# Patient Record
Sex: MALE | Race: WHITE | NOT HISPANIC OR LATINO | Employment: UNEMPLOYED | URBAN - NONMETROPOLITAN AREA
[De-identification: names, ages, dates, MRNs, and addresses within clinical notes are randomized per-mention and may not be internally consistent; named-entity substitution may affect disease eponyms.]

---

## 2021-11-23 ENCOUNTER — OFFICE VISIT (OUTPATIENT)
Dept: FAMILY MEDICINE | Facility: OTHER | Age: 56
End: 2021-11-23
Attending: NURSE PRACTITIONER
Payer: COMMERCIAL

## 2021-11-23 ENCOUNTER — OFFICE VISIT (OUTPATIENT)
Dept: PSYCHOLOGY | Facility: OTHER | Age: 56
End: 2021-11-23
Attending: COUNSELOR
Payer: COMMERCIAL

## 2021-11-23 ENCOUNTER — IMMUNIZATION (OUTPATIENT)
Dept: FAMILY MEDICINE | Facility: OTHER | Age: 56
End: 2021-11-23
Attending: FAMILY MEDICINE
Payer: COMMERCIAL

## 2021-11-23 VITALS
HEART RATE: 90 BPM | RESPIRATION RATE: 18 BRPM | BODY MASS INDEX: 25.61 KG/M2 | WEIGHT: 169 LBS | OXYGEN SATURATION: 98 % | DIASTOLIC BLOOD PRESSURE: 62 MMHG | SYSTOLIC BLOOD PRESSURE: 116 MMHG | HEIGHT: 68 IN | TEMPERATURE: 98.2 F

## 2021-11-23 DIAGNOSIS — F32.A DEPRESSION, UNSPECIFIED DEPRESSION TYPE: ICD-10-CM

## 2021-11-23 DIAGNOSIS — F41.9 ANXIETY: Primary | ICD-10-CM

## 2021-11-23 DIAGNOSIS — F41.9 ANXIETY: ICD-10-CM

## 2021-11-23 DIAGNOSIS — F32.A DEPRESSION: Primary | ICD-10-CM

## 2021-11-23 PROBLEM — E78.5 DYSLIPIDEMIA: Status: ACTIVE | Noted: 2021-01-26

## 2021-11-23 LAB
ALBUMIN SERPL-MCNC: 3 G/DL (ref 3.4–5)
ALP SERPL-CCNC: 55 U/L (ref 40–150)
ALT SERPL W P-5'-P-CCNC: 26 U/L (ref 0–70)
ANION GAP SERPL CALCULATED.3IONS-SCNC: 6 MMOL/L (ref 3–14)
AST SERPL W P-5'-P-CCNC: 19 U/L (ref 0–45)
BASOPHILS # BLD MANUAL: 0 10E3/UL (ref 0–0.2)
BASOPHILS NFR BLD MANUAL: 0 %
BILIRUB SERPL-MCNC: 0.4 MG/DL (ref 0.2–1.3)
BUN SERPL-MCNC: 12 MG/DL (ref 7–30)
CALCIUM SERPL-MCNC: 9.3 MG/DL (ref 8.5–10.1)
CHLORIDE BLD-SCNC: 101 MMOL/L (ref 94–109)
CO2 SERPL-SCNC: 28 MMOL/L (ref 20–32)
CREAT SERPL-MCNC: 0.88 MG/DL (ref 0.66–1.25)
EOSINOPHIL # BLD MANUAL: 0 10E3/UL (ref 0–0.7)
EOSINOPHIL NFR BLD MANUAL: 0 %
ERYTHROCYTE [DISTWIDTH] IN BLOOD BY AUTOMATED COUNT: 14.9 % (ref 10–15)
GFR SERPL CREATININE-BSD FRML MDRD: >90 ML/MIN/1.73M2
GLUCOSE BLD-MCNC: 91 MG/DL (ref 70–99)
HCT VFR BLD AUTO: 36.2 % (ref 40–53)
HGB BLD-MCNC: 11.9 G/DL (ref 13.3–17.7)
LYMPHOCYTES # BLD MANUAL: 5.5 10E3/UL (ref 0.8–5.3)
LYMPHOCYTES NFR BLD MANUAL: 58 %
MCH RBC QN AUTO: 30.2 PG (ref 26.5–33)
MCHC RBC AUTO-ENTMCNC: 32.9 G/DL (ref 31.5–36.5)
MCV RBC AUTO: 92 FL (ref 78–100)
MONOCYTES # BLD MANUAL: 0.6 10E3/UL (ref 0–1.3)
MONOCYTES NFR BLD MANUAL: 6 %
NEUTROPHILS # BLD MANUAL: 3.4 10E3/UL (ref 1.6–8.3)
NEUTROPHILS NFR BLD MANUAL: 36 %
PLAT MORPH BLD: ABNORMAL
PLATELET # BLD AUTO: 302 10E3/UL (ref 150–450)
POTASSIUM BLD-SCNC: 3.4 MMOL/L (ref 3.4–5.3)
PROT SERPL-MCNC: 10 G/DL (ref 6.8–8.8)
RBC # BLD AUTO: 3.94 10E6/UL (ref 4.4–5.9)
RBC MORPH BLD: ABNORMAL
SODIUM SERPL-SCNC: 135 MMOL/L (ref 133–144)
TSH SERPL DL<=0.005 MIU/L-ACNC: 2.24 MU/L (ref 0.4–4)
VIT B12 SERPL-MCNC: 726 PG/ML (ref 193–986)
WBC # BLD AUTO: 9.4 10E3/UL (ref 4–11)

## 2021-11-23 PROCEDURE — 85027 COMPLETE CBC AUTOMATED: CPT | Performed by: NURSE PRACTITIONER

## 2021-11-23 PROCEDURE — 99204 OFFICE O/P NEW MOD 45 MIN: CPT | Performed by: NURSE PRACTITIONER

## 2021-11-23 PROCEDURE — 93000 ELECTROCARDIOGRAM COMPLETE: CPT | Performed by: INTERNAL MEDICINE

## 2021-11-23 PROCEDURE — 82607 VITAMIN B-12: CPT | Performed by: NURSE PRACTITIONER

## 2021-11-23 PROCEDURE — 36415 COLL VENOUS BLD VENIPUNCTURE: CPT | Performed by: NURSE PRACTITIONER

## 2021-11-23 PROCEDURE — 91300 PR COVID VAC PFIZER DIL RECON 30 MCG/0.3 ML IM: CPT

## 2021-11-23 PROCEDURE — 0004A PR COVID VAC PFIZER DIL RECON 30 MCG/0.3 ML IM: CPT

## 2021-11-23 PROCEDURE — 84443 ASSAY THYROID STIM HORMONE: CPT | Performed by: NURSE PRACTITIONER

## 2021-11-23 PROCEDURE — 90791 PSYCH DIAGNOSTIC EVALUATION: CPT | Performed by: COUNSELOR

## 2021-11-23 PROCEDURE — 80053 COMPREHEN METABOLIC PANEL: CPT | Performed by: NURSE PRACTITIONER

## 2021-11-23 RX ORDER — ATORVASTATIN CALCIUM 20 MG/1
TABLET, FILM COATED ORAL
COMMUNITY
Start: 2021-11-15 | End: 2021-12-15

## 2021-11-23 RX ORDER — ALBUTEROL SULFATE 90 UG/1
1-2 AEROSOL, METERED RESPIRATORY (INHALATION)
COMMUNITY
Start: 2021-11-15 | End: 2021-12-15

## 2021-11-23 RX ORDER — LOSARTAN POTASSIUM 50 MG/1
50 TABLET ORAL
COMMUNITY
Start: 2021-11-15 | End: 2021-12-15

## 2021-11-23 ASSESSMENT — ENCOUNTER SYMPTOMS
WEAKNESS: 0
VOMITING: 0
BRUISES/BLEEDS EASILY: 0
HEARTBURN: 0
COLOR CHANGE: 1
ADENOPATHY: 0
ARTHRALGIAS: 1
MYALGIAS: 1
NAUSEA: 0
DYSPHORIC MOOD: 1
SHORTNESS OF BREATH: 0
FATIGUE: 1
PALPITATIONS: 0
TREMORS: 0
FEVER: 0
SORE THROAT: 0
CONSTIPATION: 1
HEADACHES: 1
NERVOUS/ANXIOUS: 1
ACTIVITY CHANGE: 1
NUMBNESS: 1
NECK PAIN: 1
COUGH: 0
TROUBLE SWALLOWING: 0
APPETITE CHANGE: 1
BACK PAIN: 1
DIFFICULTY URINATING: 0
SEIZURES: 0
SLEEP DISTURBANCE: 1

## 2021-11-23 ASSESSMENT — ANXIETY QUESTIONNAIRES
5. BEING SO RESTLESS THAT IT IS HARD TO SIT STILL: NEARLY EVERY DAY
2. NOT BEING ABLE TO STOP OR CONTROL WORRYING: NEARLY EVERY DAY
GAD7 TOTAL SCORE: 21
1. FEELING NERVOUS, ANXIOUS, OR ON EDGE: NEARLY EVERY DAY
IF YOU CHECKED OFF ANY PROBLEMS ON THIS QUESTIONNAIRE, HOW DIFFICULT HAVE THESE PROBLEMS MADE IT FOR YOU TO DO YOUR WORK, TAKE CARE OF THINGS AT HOME, OR GET ALONG WITH OTHER PEOPLE: EXTREMELY DIFFICULT
7. FEELING AFRAID AS IF SOMETHING AWFUL MIGHT HAPPEN: NEARLY EVERY DAY
4. TROUBLE RELAXING: NEARLY EVERY DAY
6. BECOMING EASILY ANNOYED OR IRRITABLE: NEARLY EVERY DAY
3. WORRYING TOO MUCH ABOUT DIFFERENT THINGS: NEARLY EVERY DAY

## 2021-11-23 ASSESSMENT — PAIN SCALES - GENERAL: PAINLEVEL: MODERATE PAIN (4)

## 2021-11-23 ASSESSMENT — MIFFLIN-ST. JEOR: SCORE: 1565.84

## 2021-11-23 NOTE — PROGRESS NOTES
"    [facility]  Provider Name:   Lyle Jeffrey, Credentials: M.S.,[facility]    PATIENT'S NAME: Yamil Vanegas  PREFERRED NAME:  Yamil  PRONOUNS: viviana Austin  MRN: [id number]  : 1965  ADDRESS: [address] [place] [address]  ACCT. NUMBER:  [id number]  DATE OF SERVICE: 21  START TIME: 0800  END TIME: 930  PREFERRED PHONE: [phone]  May we leave a program related message: Yes  SERVICE MODALITY:  In-person    [facility] DIAGNOSTIC ASSESSMENT    Identifying Information:  Patient is a 56 year old,  .  The pronoun use throughout this assessment reflects the patient's chosen pronoun.  Patient was referred for an assessment by family .  Patient attended the session with sister during a great part of the interview..    Chief Complaint:   The reason for seeking services at this time is: \"  Suicidal thoughts and depression \"   The problem(s) began in Patient has attempted to resolve these concerns in the past through hospitalization,medication,talking with family and friends..    Social/Family History:  Patient reported they grew up in [place], [place].  They were raised by biological mother.  Parents  40 plus years ago when the patient was 9 years old. The patient mother did remarry 23 years ago The patient's father did remarry 33 years ago.   Patient reported that their childhood was 'painful'.  Patient described their current relationships with family of origin as  strained with brothers,son and wife.      The patient describes their cultural background as .  Cultural influences and impact on patient's life structure, values, norms, and healthcare: White working class.  Contextual influences on patient's health include:separation from wife,recent death of father, and a long standing dispute with a brother.    These factors will be addressed in the Preliminary Treatment plan.  Patient identified their preferred language to be English. Patient reported " "they do not  need the assistance of an  or other support involved in therapy.     Patient reported had no significant delays in developmental tasks.   Patient's highest education level was high school graduate. Patient identified the following learning problems: concentration.  Modifications will not be used to assist communication in therapy.Patient reports they is  able to understand written materials.    Patient reported the following relationship history   Patient's current relationship status is  for eight years.(been for eight years). He is presently  from his wife  Patient identified their sexual orientation as heterosexual.  Patient reported having five child(juan). Patient identified sister and wife as part of their support system.  Patient identified the quality of these relationships as generally      Patient's current living/housing situation involves staying with sister.  They live with  Sister and they report that housing is stable.     Patient is currently unemployed.  Patient reports their finances are obtained through unemployment.  Patient does identify finances as a current stressor.    ve\"} been involved with the legal system.  Patient denies being on probation / parole / under the jurisdiction of the court.      Patient reported that they {[id number]::\"ha  Patient's Strengths and Limitations:  Patient identified the following strengths or resources that will help them succeed in treatment: family support. Things that may interfere with the patient's success in treatment include: few friends and financial hardship.     Personal and Family Medical History:   Patient does report a family history of mental health concerns.  Patient reports family history includes Cancer in his father and mother..     Patient does not report Mental Health Diagnosis or Treatment.      Patient has had a physical exam to rule out medical causes for current symptoms.  Date of last physical exam " was greater than a year ago and client was encouraged to schedule an exam with PCP. The patient does not have a Primary Care Provider and was encouraged to establish care with a PCP..  Patient reports the following current medical concerns: chronic pain from physical labor..  Patient denies any issues with pain..  The pt has chronic pain   There are not significant appetite / nutritional concerns / weight changes.   Patient does report a history of head injury / trauma / cognitive impairment.  He has a hx  Of two concussions.    Patient reports current meds as:   No outpatient medications have been marked as taking for the 11/23/21 encounter (Office Visit) with Lyle [facility].       Medication Adherence:  Patient reports taking prescribed medications as prescribed.    Patient Allergies:    Allergies   Allergen Reactions     Other Environmental Allergy      Other reaction(s): *Unknown - Childhood Rxn  Seasonal allergies       Medical History:    Past Medical History:   Diagnosis Date     Hyperlipidemia      Hypertension          Current Mental Status Exam:   Appearance:  Appropriate    Eye Contact:  Good   Psychomotor:  Normal       Gait / station:  no problem  Attitude / Demeanor: Cooperative   Speech      Rate / Production: Talkative      Volume:  Normal  volume      Language:  intact  Mood:   Anxious  Dysphoric  Affect:   Appropriate  Worrisome    Thought Content: Rumination   Thought Process: Obsessive       Associations: No loosening of associations  Insight:   Fair   Judgment:  Intact   Orientation:  All  Attention/concentration: Needs Redirection    Rating Scales:    PHQ9:    PHQ-9 SCORE 11/23/2021   PHQ-9 Total Score 25   ;    GAD7:    AILEEN-7 SCORE 11/23/2021   Total Score 21     CGI:     First:No data recorded;    Most recentNo data recorded    Substance Use:  Patient reported the following biological family members or relatives with chemical health issues:  brother and father...  Patient has not  received substance use disorder and/or gambling treatment in the past.  Patient has not ever been to detox.  Patient is not currently receiving any chemical dependency treatment. Patient reports no history of support group attendance.                                                                         Although the pt has a substantial hx of cannabis abuse. It is not area of address because he is notb using presently.    Significant Losses / Trauma / Abuse / Neglect Issues:   Patient  Never  serve in the .  There are indications or report of significant loss, trauma, abuse or neglect issues related to: death of father.  Concerns for possible neglect are not present.     Safety Assessment:   Current Safety Concerns:  [place] Suicide Severity Rating Scale (Short Version)  [place] Suicide Severity Rating (Short Version) 11/23/2021   Over the past 2 weeks have you felt down, depressed, or hopeless? yes   Over the past 2 weeks have you had thoughts of killing yourself? yes   Have you ever attempted to kill yourself? no     Patient denies current homicidal ideation and behaviors.  Patient denies current self-injurious ideation and behaviors.    Patient denied risk behaviors associated with substance use.  Patient denies any high risk behaviors associated with mental health symptoms.  Patient reports the following current concerns for their personal safety: None.  Patient reports there  are firearms in the house.      The firearms are secured in a locked space.    History of Safety Concerns:  Patient denied a history of homicidal ideation.     Patient denied a history of personal safety concerns.    Patient denied a history of assaultive behaviors.    Patient denied a history of sexual assault behaviors.     Patient denied a history of risk behaviors associated with substance use.  Patient reported a history of substance use associated with mental health symptoms.  Patient reports the following protective  factors:      Risk Plan:  See Recommendations for Safety and Risk Management Plan    Review of Symptoms per patient report:  Depression: Change in sleep, Excessive or inappropriate guilt, Change in energy level, Difficulties concentrating, Suicidal ideation, Low self-worth and Ruminations  Yasmeen:  No Symptoms  Psychosis: No Symptoms  Anxiety: Excessive worry, Nervousness, Sleep disturbance, Ruminations and Poor concentration  Panic:  No symptoms  Post Traumatic Stress Disorder:  No Symptoms   Eating Disorder: No Symptoms  ADD / ADHD:  Difficulties listening, Distractibility, Interrupts, Impulsive, Restlessness/fidgety and Hyperverbal  Conduct Disorder: No symptoms  Autism Spectrum Disorder: No symptoms  Obsessive Compulsive Disorder: No Symptoms    Patient reports  No compulsive behaviors and treatment history: .      Diagnostic Criteria:   Major Depressive Disorder  A) Recurrent episode(s) - symptoms have been present during the same 2-week period and represent a change from previous functioning 5 or more symptoms (required for diagnosis)   - Depressed mood. Note: In children and adolescents, can be irritable mood.     - Diminished interest or pleasure in all, or almost all, activities.    - Significant weight gaindecrease in appetite.    - Decreased sleep.    - Psychomotor activity agitation.    - Fatigue or loss of energy.    - Feelings of worthlessness or inappropriate guilt.    - Diminished ability to think or concentrate, or indecisiveness.    - Recurrent thoughts of death (not just fear of dying), recurrent suicidal ideation without a specific plan, or a suicide attempt or a specific plan for committing suicide.   B) The symptoms cause clinically significant distress or impairment in social, occupational, or other important areas of functioning  C) The episode is not attributable to the physiological effects of a substance or to another medical condition  D) The occurence of major depressive episode is not  better explained by other thought / psychotic disorders    Functional Status:  Patient reports the following functional impairments: relationship(s), self-care, social interactions and work / vocational responsibilities.     WHODAS: No flowsheet data found.      Clinical Summary:  1. Reason for assessment: suicidal ideations.  2. Psychosocial, Cultural and Contextual Factors: recent unemployment,death of father,relaocation to [place].  3. Principal DSM5 Diagnoses  (Sustained by DSM5 Criteria Listed Above):   296.30 (F33.9) Major Depressive Disorder, Recurrent Episode, Unspecified _ and With anxious distress.  4. Other Diagnoses that is relevant to services:   Attention-Deficit/Hyperactivity Disorder  314.01 (F90.9) Unspecified Attention -Deficit / Hyperactivity Disorder.  5. Provisional Diagnosis:  296.40 Bipolar I Disorder Most Recent Episode Hypomanic, Unspecified as evidenced by history of mood instability .  6. Prognosis: Return to Normal Functioning.  7. Likely consequences of symptoms if not treated: further psychosocial decline  8. Client strengths include:  support of family, friends and providers .     Recommendations:     1. Plan for Safety and Risk Management:   Recommended that patient call 911 or go to the local ED should there be a change in any of these risk factors..          Report to child / adult protection services was NA.     2. Patient's identified Reconciling with wife and son,the death of his father,life long disputes with brother,relocating to the [place] area.     3. Initial Treatment will focus on:    Depressed Mood - suicidal ideations,poor sleep,and weight loss.guilt  Anxiety - poor sleep,excessive worry  Adjustment Difficulties related to: family concerns and unemployment.     4. Resources/Service Plan:    services are not indicated.   Modifications to assist communication are not indicated.   Additional disability accommodations are not indicated.      5.  Collaboration:   Collaboration / coordination of treatment will be initiated with the following  support professionals: Behavioral Health Clinician ([facility]).      6.  Referrals:   The following referral(s) will be initiated: Behavioral Health Home if patient is appropriate.     A Release of Information has been obtained for the following: None at this time..    7. JAYCE:    JAYCE:  Discussed the general effects of drugs and alcohol on health and well-being. Provider gave patient printed information about the effects of chemical use on their health and well being. Recommendations:  (Pt. Is denying an immediate need for such now.).     8. Records:   These were not available for review at time of assessment.   Information in this assessment was obtained from the medical record and  provided by patient and family who is a good historian.    .        Provider Name/ Credentials:  Lyle Jeffrey  November 23, 2021    The author of this note documented a reason for not sharing it with the patient.

## 2021-11-23 NOTE — NURSING NOTE
"Chief Complaint   Patient presents with     Anxiety       Initial /62 (BP Location: Right arm, Patient Position: Chair, Cuff Size: Adult Regular)   Pulse 90   Temp 98.2  F (36.8  C) (Tympanic)   Resp 18   Ht 1.735 m (5' 8.3\")   Wt 76.7 kg (169 lb)   SpO2 98%   BMI 25.47 kg/m   Estimated body mass index is 25.47 kg/m  as calculated from the following:    Height as of this encounter: 1.735 m (5' 8.3\").    Weight as of this encounter: 76.7 kg (169 lb).  Medication Reconciliation: complete  Pamela M. Lechevalier, LPN    "

## 2021-11-23 NOTE — PROGRESS NOTES
Hamlet Lobo is a 56 year old who presents for the following health issues    HPI     Abnormal Mood Symptoms  Onset/Duration: since childhood   Description: sever depression and anxiety thinks ptsd   Depression (if yes, do PHQ-9): YES  Anxiety (if yes, do AILEEN-7): YES  Accompanying Signs & Symptoms:  Still participating in activities that you used to enjoy: YES  Fatigue: YES  Irritability: YES  Difficulty concentrating: YES  Changes in appetite: YES  Problems with sleep: YES  Heart racing/beating fast: YES  Abnormally elevated, expansive, or irritable mood: YES  Persistently increased activity or energy: YES  Thoughts of hurting yourself or others: no  History:  Recent stress or major life event: YES  Prior depression or anxiety: yes but no medications   Family history of depression or anxiety: YES  Alcohol/drug use: no  Difficulty sleeping: YES  Precipitating or alleviating factors: None  Therapies tried and outcome: individual therapy  PHQ 11/23/2021   PHQ-9 Total Score 25   Q9: Thoughts of better off dead/self-harm past 2 weeks Several days     AILEEN-7 SCORE 11/23/2021   Total Score 21         Review of Systems   Constitutional: Positive for activity change, appetite change and fatigue. Negative for fever.        Has lost weight this year.  30lbs   HENT: Positive for hearing loss. Negative for congestion, ear pain, sore throat and trouble swallowing.    Respiratory: Negative for cough and shortness of breath.         Smokes 1/2-1 pack a day for 43 years.  Smokes marijuana  Last the 18th of November,    Has inhaler     Cardiovascular: Negative for chest pain, palpitations and peripheral edema.        Hx hypertension, Hyperlipidemia.     Gastrointestinal: Positive for constipation. Negative for heartburn, nausea and vomiting.        Occasional stomach upset.    Genitourinary: Negative for difficulty urinating.   Musculoskeletal: Positive for arthralgias, back pain, myalgias and neck pain.        Marijuana  "dulls joint pain    Worked as  for years, Laid off in August.     Skin: Positive for color change. Negative for rash.        Hands with raynauds like symptoms.     Neurological: Positive for numbness and headaches. Negative for tremors, seizures and weakness.   Hematological: Negative for adenopathy. Does not bruise/bleed easily.   Psychiatric/Behavioral: Positive for dysphoric mood, sleep disturbance and suicidal ideas. The patient is nervous/anxious.         Has no suicidal  intention or plan. Hx of marijuana use.  Seldom drinks  Tried Meth once.  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  PHQ-9 is 25            Objective    /62 (BP Location: Right arm, Patient Position: Chair, Cuff Size: Adult Regular)   Pulse 90   Temp 98.2  F (36.8  C) (Tympanic)   Resp 18   Ht 1.735 m (5' 8.3\")   Wt 76.7 kg (169 lb)   SpO2 98%   BMI 25.47 kg/m    Body mass index is 25.47 kg/m .  Physical Exam  Constitutional:       General: He is not in acute distress.     Appearance: Normal appearance. He is not ill-appearing.   Neck:      Vascular: No carotid bruit.   Cardiovascular:      Rate and Rhythm: Normal rate and regular rhythm.      Pulses: Normal pulses.      Heart sounds: Murmur heard.        Comments: 1-2/6 systolic murmur when lays down    Pulmonary:      Effort: Pulmonary effort is normal.      Breath sounds: Normal breath sounds.   Musculoskeletal:         General: Normal range of motion.      Cervical back: Normal range of motion and neck supple.      Comments: Knees click with movement. Left hip hurts when laying.     Lymphadenopathy:      Cervical: No cervical adenopathy.   Skin:     General: Skin is warm and dry.      Capillary Refill: Capillary refill takes less than 2 seconds.      Findings: No rash.      Comments: No hair on legs.  Noted fingers whiten when stretches fingers out.     Neurological:      General: No focal deficit present.      Mental Status: He is alert and oriented " to person, place, and time.   Psychiatric:         Mood and Affect: Mood normal.         Behavior: Behavior normal.        Results for orders placed or performed in visit on 11/23/21   Comprehensive metabolic panel (BMP + Alb, Alk Phos, ALT, AST, Total. Bili, TP)     Status: Abnormal   Result Value Ref Range    Sodium 135 133 - 144 mmol/L    Potassium 3.4 3.4 - 5.3 mmol/L    Chloride 101 94 - 109 mmol/L    Carbon Dioxide (CO2) 28 20 - 32 mmol/L    Anion Gap 6 3 - 14 mmol/L    Urea Nitrogen 12 7 - 30 mg/dL    Creatinine 0.88 0.66 - 1.25 mg/dL    Calcium 9.3 8.5 - 10.1 mg/dL    Glucose 91 70 - 99 mg/dL    Alkaline Phosphatase 55 40 - 150 U/L    AST 19 0 - 45 U/L    ALT 26 0 - 70 U/L    Protein Total 10.0 (H) 6.8 - 8.8 g/dL    Albumin 3.0 (L) 3.4 - 5.0 g/dL    Bilirubin Total 0.4 0.2 - 1.3 mg/dL    GFR Estimate >90 >60 mL/min/1.73m2   TSH with free T4 reflex     Status: Normal   Result Value Ref Range    TSH 2.24 0.40 - 4.00 mU/L   CBC with platelets and differential     Status: Abnormal   Result Value Ref Range    WBC Count 9.4 4.0 - 11.0 10e3/uL    RBC Count 3.94 (L) 4.40 - 5.90 10e6/uL    Hemoglobin 11.9 (L) 13.3 - 17.7 g/dL    Hematocrit 36.2 (L) 40.0 - 53.0 %    MCV 92 78 - 100 fL    MCH 30.2 26.5 - 33.0 pg    MCHC 32.9 31.5 - 36.5 g/dL    RDW 14.9 10.0 - 15.0 %    Platelet Count 302 150 - 450 10e3/uL   Manual Differential     Status: Abnormal   Result Value Ref Range    % Neutrophils 36 %    % Lymphocytes 58 %    % Monocytes 6 %    % Eosinophils 0 %    % Basophils 0 %    Absolute Neutrophils 3.4 1.6 - 8.3 10e3/uL    Absolute Lymphocytes 5.5 (H) 0.8 - 5.3 10e3/uL    Absolute Monocytes 0.6 0.0 - 1.3 10e3/uL    Absolute Eosinophils 0.0 0.0 - 0.7 10e3/uL    Absolute Basophils 0.0 0.0 - 0.2 10e3/uL    RBC Morphology Confirmed RBC Indices     Platelet Assessment  Automated Count Confirmed. Platelet morphology is normal.     Automated Count Confirmed. Platelet morphology is normal.   CBC with platelets and differential      Status: Abnormal    Narrative    The following orders were created for panel order CBC with platelets and differential.  Procedure                               Abnormality         Status                     ---------                               -----------         ------                     CBC with platelets and d...[[id number]]  Abnormal            Final result               Manual Differential[[id number]]          Abnormal            Final result                 Please view results for these tests on the individual orders.       ASSESSMENT / PLAN:  (F41.9) Anxiety  (primary encounter diagnosis)  Comment: will make sure EKG, and TSH OK. B12, and       1. Zoloft 50mg  Take 1/2 pill a day for 7 days, and then 1 pill(50mg) a day.   Tell Zahida right away if suicidal thoughts increase.  Do not stop med without Provider input.    2. Follow up with new provider.    3. EKG shows NSR without acute changes.      Plan: EKG 12-lead complete w/read - (Clinic         Performed), Comprehensive metabolic panel (BMP         + Alb, Alk Phos, ALT, AST, Total. Bili, TP),         TSH with free T4 reflex, CBC with platelets and        differential, Vitamin B12, sertraline (ZOLOFT)         50 MG tablet            (F32.A) Depression, unspecified depression type  Comment:   1. Zoloft 50mg  Take 1/2 pill a day for 7 days, and then 1 pill(50mg) a day.   Tell Zahida right away if suicidal thoughts increase.  Do not stop med without Provider input.    2. Follow up with new provider.      Plan: EKG 12-lead complete w/read - (Clinic         Performed), Comprehensive metabolic panel (BMP         + Alb, Alk Phos, ALT, AST, Total. Bili, TP),         TSH with free T4 reflex, CBC with platelets and        differential, Vitamin B12, sertraline (ZOLOFT)         50 MG tablet            Total Protein is HIGH, Needs to be rechecked.

## 2021-11-23 NOTE — PATIENT INSTRUCTIONS
1. Zoloft 50mg  Take 1/2 pill a day for 7 days, and then 1 pill(50mg) a day.   Tell Zahida right away if suicidal thoughts increase.  Do not stop med without Provider input.    2. Follow up with new provider.

## 2021-11-24 ENCOUNTER — OFFICE VISIT (OUTPATIENT)
Dept: PSYCHOLOGY | Facility: OTHER | Age: 56
End: 2021-11-24
Attending: COUNSELOR
Payer: COMMERCIAL

## 2021-11-24 DIAGNOSIS — F41.9 ANXIETY AND DEPRESSION: Primary | ICD-10-CM

## 2021-11-24 DIAGNOSIS — F32.A ANXIETY AND DEPRESSION: Primary | ICD-10-CM

## 2021-11-24 PROCEDURE — 90837 PSYTX W PT 60 MINUTES: CPT | Performed by: COUNSELOR

## 2021-11-24 ASSESSMENT — PATIENT HEALTH QUESTIONNAIRE - PHQ9: SUM OF ALL RESPONSES TO PHQ QUESTIONS 1-9: 25

## 2021-11-24 ASSESSMENT — ANXIETY QUESTIONNAIRES: GAD7 TOTAL SCORE: 21

## 2021-11-29 ENCOUNTER — OFFICE VISIT (OUTPATIENT)
Dept: PSYCHOLOGY | Facility: OTHER | Age: 56
End: 2021-11-29
Attending: COUNSELOR
Payer: COMMERCIAL

## 2021-11-29 DIAGNOSIS — F32.A ANXIETY AND DEPRESSION: Primary | ICD-10-CM

## 2021-11-29 DIAGNOSIS — F41.9 ANXIETY AND DEPRESSION: Primary | ICD-10-CM

## 2021-11-29 PROCEDURE — 90837 PSYTX W PT 60 MINUTES: CPT | Performed by: COUNSELOR

## 2021-11-29 NOTE — PROGRESS NOTES
"                                           Progress Note    Client Name: Yamil Vanegas  Date: November 30, 2021         Service Type: Individual      Session Start Time: 1100  Session End Time: 1200      Session Length: 55         Attendees: Client            PHQ-9 :   PHQ-9 SCORE 11/23/2021   PHQ-9 Total Score 25      AILEEN-7 :    AILEEN-7 SCORE 11/23/2021   Total Score 21           DATA      Yamil  Presented to his appointment as O x 4,coherent,relevant,pleasant and cooperative. He denied S/H ideations. The pt assured me of his safety and multiple times denied any present suicidal thought. His memory appeared to be grossly intact. His insight and judgement appeared to be in a range acceptable for safety. Yamil was asked about his present plans.     The pt says that he hopes to remain the [place] area. He has been speaking with his wife. He says he wants to get himself \"together\" so he could attend to his relationship better.He speaks repeatedly about the on-going dispute that he has with his brother Matt for almost fifty years.Gets along with his sister well and with another brother O.K.  The pt is planning to relocate,find some type of employment and get his wife to come to the area.    He appears hopeful. He is hoping to get connect to some type of employment source.        Progress Since Last Session (Related to Symptoms / Goals / Homework):   Symptoms: No change Just beginning therapy    Homework: Completed in session     Current / Ongoing Stressors and Concerns:                Death of father,separation from wife, recent unemployment and an on-going argument with a brother are major stressors for the pt.     Treatment Objective(s) Addressed in This Session:      A reduction in anxiety     Intervention:                   Psycho-education and motivational interviewing     Response to Interventions:                   Receptive and attended as best he could.     ASSESSMENT: Current Emotional / Mental Status (status of " significant symptoms):   Risk status (Self / Other harm or suicidal ideation)   Client denies current fears or concerns for personal safety.   Client denies current or recent suicidal ideation or behaviors.   Client denies current or recent homicidal ideation or behaviors.   Client denies current or recent self injurious behavior or ideation.   Client denies other safety concerns.   Recommended that patient call 911 or go to the local ED should there be a change in any of these risk factors.     Appearance:   Appropriate    Eye Contact:   Good    Psychomotor Behavior: Restless    Attitude:   Cooperative    Orientation:   All   Speech    Rate / Production: talkative    Volume:  Normal    Mood:    Anxious  Dysphoric   Affect:    Mood congruent   Thought Content:  Rumination   Thought Form:  Obsessive    Insight:    Fair      Diagnosis: Major Depressive D.O.        Medication Compliance:   Just starting a new regimen     Changes in Health Issues:   None reported     Chemical Use Review:   Substance Use: Chemical use reviewed, no active concerns identified      Tobacco Use: Pt is a daily smoker     Collateral Reports Completed:   Not Applicable    PLAN: (Client Tasks / Therapist Tasks / Other)     improved mood. Develop a treatment plan.    JHONY De La Torre  The author of this note documented a reason for not sharing it with the patient.

## 2021-11-30 NOTE — PROGRESS NOTES
Progress Note    Client Name: Yamil Vanegas  Date: November 24, 2021         Service Type: Individual      Session Start Time: 1000  Session End Time: 1100       Session Length: 55     Attendees: Client      PHQ-9 :   PHQ-9 SCORE 11/23/2021   PHQ-9 Total Score 25      AILEEN-7 :    AILEEN-7 SCORE 11/23/2021   Total Score 21           DATA    Yamil was seen today as scheduled.He presented as O x 4 ,cohrerent,relevant and in improved spirits. He was not suicidal and seemed future orientated.He denied homicidal ideations, The pt memory was intact.His insight and judgement were within a range acceptable for safety     Yamil reported  that some areas of his life has to change. He said that he had discussed moving to [place] with his life. She did not appear totally against it Remained focused on a long standing arguments with a brother. Has registered with [facility] Living with sister until he can get his own place. The pt has agreed to see weekly until he can adjust to multiple changes      Progress Since Last Session (Related to Symptoms / Goals / Homework):   Symptoms: Improving pt denied s/h ideations when seen.    Homework: Completed in session     Current / Ongoing Stressors and Concerns:                    Adjusting to death of father,a major argument with brother and his relocation to [place].         Treatment Objective(s) Addressed in This Session:      Improved mood       Intervention:                  Motivational intervention     Response to Interventions:                   Attended,listened. Appeared to benefit from an opportunity to vent.      ASSESSMENT: Current Emotional / Mental Status (status of significant symptoms):   Risk status (Self / Other harm or suicidal ideation)   Client denies current fears or concerns for personal safety.   Client denies current or recent suicidal ideation or behaviors.   Client denies current or recent homicidal ideation or  behaviors.   Client denies current or recent self injurious behavior or ideation.   Client denies other safety concerns.   Recommended that patient call 911 or go to the local ED should there be a change in any of these risk factors.     Appearance:   Appropriate    Eye Contact:   Good    Psychomotor Behavior: Normal    Attitude:   Cooperative    Orientation:   All   Speech    Rate / Production: Normal     Volume:  Normal    Mood:    Anxious  Dysphoric   Affect:    Mood congruent   Thought Content:  Clear  Rumination    Thought Form:  Obsessive    Insight:    Fair         Medication Compliance:   Just started meds.     Changes in Health Issues:   None reported     Chemical Use Review:   Substance Use: Chemical use reviewed, no active concerns identified      Tobacco Use: The      Collateral Reports Completed:   Not Applicable    PLAN: (Client Tasks / Therapist Tasks / Other)     Establish a regular schedule to be seen.     Lyle Jeffrey [facility]    The author of this note documented a reason for not sharing it with the patient.

## 2021-12-03 NOTE — PROGRESS NOTES
Assessment & Plan     Encounter for medical examination to establish care  - Comprehensive metabolic panel  - Patient plans to reside on the [place] indefinitely. Reviewed health maintenance topics with patient.     Primary hypertension  - Comprehensive metabolic panel  - Managed well with losartan.    Dyslipidemia  - Lipid Profile (Chol, Trig, HDL, LDL calc); Future  - Lipid Profile (Chol, Trig, HDL, LDL calc)  - Comprehensive metabolic panel  - Managed well with lipitor. Will recheck in 1 year.  - Did have carotid US 10/2020 with indications of <50% occlusion. Will address this with patient at [facility] For monitoring.    Anemia, unspecified type  - CBC with platelets and differential; Future  - CBC with platelets and differential  - Comprehensive metabolic panel  - Patient has long standing history of anemia. Had previously had weight loss, with malnutrition. He has been following a well rounded diet for the last month. This may be his baseline Hgb, but will continue to monitor. Some concern since also has high protein level and lymphocytosis-- question bone marrow issue.     Adjustment disorder with depressed mood  - Comprehensive metabolic panel  - Mood has improved with environmental stability and current medication regimen of zoloft and wellbutrin. Will continue to work with psychologist and psychiatrist.     Tobacco use disorder  - Comprehensive metabolic panel  - CT Chest Lung Cancer Scrn Low Dose wo; Future  - Patient is working on quitting smoking. Is at 2 packs in 5 days. Declines needing assistance at this time. I discussed completing a low dose CT for lung cancer screening and he is agreeable to that.     Vaccine counseling  - Comprehensive metabolic panel  - Patient reports getting his flu shot although we do not have record of it. He did have all 3 covid shots.    Special screening for malignant neoplasms, colon  - PSA, screen; Future  - PSA, screen  - Comprehensive metabolic panel  - PSA  "within normal limits. Recheck in 1 year.    Screening for prostate cancer  - Comprehensive metabolic panel  - Patient is not currently interested in screening.    Elevated serum protein level  Night sweats  Lymphocytosis  - HIV Antigen Antibody Combo  - CRP inflammation  - Erythrocyte sedimentation rate auto  - Hepatitis C Screen Reflex to HCV RNA Quant and Genotype  - Flow Cytometry Blood  - LABELS  - Protein electrophoresis  - Discussed with patient his lab results. He has had an elevated protein level and lymphocytes. This in combination with experiencing night sweats it is reasonable to pursue further evaluation for possible HIV, hepatitis C, leukemia, multiple myeloma or other inflammatory source. Patient does have a history of IV drug use. Patient is agreeable to the work up. He will follow up in clinic in 6-8 weeks for review of the results. He will contact the clinic sooner with concerns.    Follow-up in 5-6 weeks but will call with results above.     41 minutes spent on the date of the encounter doing chart review, history and exam, documentation and further activities per the note       Tobacco Cessation:   reports that he has been smoking cigarettes. He has a 43.00 pack-year smoking history. He has never used smokeless tobacco.  Tobacco Cessation Action Plan: Information offered: Patient not interested at this time    BMI:   Estimated body mass index is 27.13 kg/m  as calculated from the following:    Height as of this encounter: 1.735 m (5' 8.3\").    Weight as of this encounter: 81.6 kg (180 lb).       No follow-ups on file.    Сергей Macias MD  [facility] - LUIS Lobo is a 56 year old who presents for the following health issues     HPI     Depression and Anxiety Follow-Up    How are you doing with your depression since your last visit? Improved     How are you doing with your anxiety since your last visit?  Improved     Are you having other symptoms that might be " associated with depression or anxiety? No    Have you had a significant life event? No     Do you have any concerns with your use of alcohol or other drugs? No     Chronic back pain    Was told in the past to take iron    Social History     Tobacco Use     Smoking status: Current Every Day Smoker     Packs/day: 1.00     Years: 43.00     Pack years: 43.00     Types: Cigarettes     Smokeless tobacco: Never Used   Substance Use Topics     Alcohol use: Yes     Comment: some times     Drug use: Not Currently     PHQ 11/23/2021 12/6/2021 12/10/2021   PHQ-9 Total Score 25 19 10   Q9: Thoughts of better off dead/self-harm past 2 weeks Several days More than half the days Several days     AILEEN-7 SCORE 11/23/2021 12/6/2021 12/10/2021   Total Score 21 13 7     Last PHQ-9 12/10/2021   1.  Little interest or pleasure in doing things 1   2.  Feeling down, depressed, or hopeless 1   3.  Trouble falling or staying asleep, or sleeping too much 2   4.  Feeling tired or having little energy 1   5.  Poor appetite or overeating 1   6.  Feeling bad about yourself 1   7.  Trouble concentrating 1   8.  Moving slowly or restless 1   Q9: Thoughts of better off dead/self-harm past 2 weeks 1   PHQ-9 Total Score 10   Difficulty at work, home, or with people Very difficult     AILEEN-7  12/10/2021   1. Feeling nervous, anxious, or on edge 1   2. Not being able to stop or control worrying 1   3. Worrying too much about different things 1   4. Trouble relaxing 1   5. Being so restless that it is hard to sit still 1   6. Becoming easily annoyed or irritable 1   7. Feeling afraid, as if something awful might happen 1   AILEEN-7 Total Score 7   If you checked any problems, how difficult have they made it for you to do your work, take care of things at home, or get along with other people? Very difficult       Suicide Assessment Five-step Evaluation and Treatment (SAFE-T)  -+--      Review of Systems   CONSTITUTIONAL: Constant night sweats (years), weight  "loss, but gaining back 11 lbs since Novemeber  NEGATIVE for fever, chills  INTEGUMENTARY/SKIN: NEGATIVE for worrisome rashes, moles or lesions  EYES: Wears glasses NEGATIVE for vision changes or irritation  ENT/MOUTH: NEGATIVE for ear, mouth and throat problems  RESP: Is working on smoking cessation- SOB with exertion NEGATIVE for significant cough or SOB  CV: NEGATIVE for chest pain, palpitations or peripheral edema  GI: NEGATIVE for nausea, abdominal pain, heartburn, or change in bowel habits  : history of kidney stone a few years ago NEGATIVE for frequency, dysuria, or hematuria  MUSCULOSKELETAL: Polyarthritis   NEURO: Left foot paresthesia NEGATIVE for weakness, dizziness  ENDOCRINE: NEGATIVE for temperature intolerance, skin/hair changes  HEME: NEGATIVE for bleeding problems  PSYCHIATRIC: Is seeing Kailash Jeffrey, and seeing I[facility] MARTINEZ psychiatry      Objective    /70   Pulse 88   Temp 96.9  F (36.1  C)   Ht 1.735 m (5' 8.3\")   Wt 81.6 kg (180 lb)   SpO2 96%   BMI 27.13 kg/m    Body mass index is 27.13 kg/m .  Physical Exam   GENERAL: healthy, alert and no distress  EYES: Eyes grossly normal to inspection, PERRL and conjunctivae and sclerae normal  HENT: ear canals and TM's normal, nose and mouth without ulcers or lesions  NECK: no adenopathy, no asymmetry, masses, or scars and thyroid normal to palpation  RESP: lungs clear to auscultation - no rales, rhonchi or wheezes  CV: regular rate and rhythm, normal S1 S2, no S3 or S4, no murmur, click or rub, no peripheral edema and peripheral pulses strong  ABDOMEN: soft, nontender, no hepatosplenomegaly, no masses and bowel sounds normal  MS: no gross musculoskeletal defects noted, no edema  SKIN: no suspicious lesions or rashes  NEURO: Normal strength and tone, mentation intact and speech normal  PSYCH: mentation appears normal, affect normal/bright    Results for orders placed or performed in visit on 12/10/21   PSA, screen     Status: Normal   Result " Value Ref Range    Prostate Specific Antigen Screen 0.92 0.00 - 4.00 ug/L    Narrative    Assay Method:  Chemiluminescence using Siemens   Vista analyzer.   Lipid Profile (Chol, Trig, HDL, LDL calc)     Status: Abnormal   Result Value Ref Range    Cholesterol 105 <200 mg/dL    Triglycerides 149 <150 mg/dL    Direct Measure HDL 39 (L) >=40 mg/dL    LDL Cholesterol Calculated 36 <=100 mg/dL    Non HDL Cholesterol 66 <130 mg/dL    Patient Fasting > 8hrs? No     Narrative    Cholesterol  Desirable:  <200 mg/dL    Triglycerides  Normal:  Less than 150 mg/dL  Borderline High:  150-199 mg/dL  High:  200-499 mg/dL  Very High:  Greater than or equal to 500 mg/dL    Direct Measure HDL  Female:  Greater than or equal to 50 mg/dL   Male:  Greater than or equal to 40 mg/dL    LDL Cholesterol  Desirable:  <100mg/dL  Above Desirable:  100-129 mg/dL   Borderline High:  130-159 mg/dL   High:  160-189 mg/dL   Very High:  >= 190 mg/dL    Non HDL Cholesterol  Desirable:  130 mg/dL  Above Desirable:  130-159 mg/dL  Borderline High:  160-189 mg/dL  High:  190-219 mg/dL  Very High:  Greater than or equal to 220 mg/dL   CBC with platelets and differential     Status: Abnormal   Result Value Ref Range    WBC Count 8.2 4.0 - 11.0 10e3/uL    RBC Count 3.70 (L) 4.40 - 5.90 10e6/uL    Hemoglobin 11.4 (L) 13.3 - 17.7 g/dL    Hematocrit 34.8 (L) 40.0 - 53.0 %    MCV 94 78 - 100 fL    MCH 30.8 26.5 - 33.0 pg    MCHC 32.8 31.5 - 36.5 g/dL    RDW 16.0 (H) 10.0 - 15.0 %    Platelet Count 240 150 - 450 10e3/uL   Comprehensive metabolic panel     Status: Abnormal   Result Value Ref Range    Sodium 139 133 - 144 mmol/L    Potassium 3.8 3.4 - 5.3 mmol/L    Chloride 106 94 - 109 mmol/L    Carbon Dioxide (CO2) 27 20 - 32 mmol/L    Anion Gap 6 3 - 14 mmol/L    Urea Nitrogen 12 7 - 30 mg/dL    Creatinine 0.78 0.66 - 1.25 mg/dL    Calcium 9.3 8.5 - 10.1 mg/dL    Glucose 115 (H) 70 - 99 mg/dL    Alkaline Phosphatase 55 40 - 150 U/L    AST 15 0 - 45 U/L    ALT  28 0 - 70 U/L    Protein Total 9.7 (H) 6.8 - 8.8 g/dL    Albumin 2.7 (L) 3.4 - 5.0 g/dL    Bilirubin Total 0.3 0.2 - 1.3 mg/dL    GFR Estimate >90 >60 mL/min/1.73m2   Manual Differential     Status: Abnormal   Result Value Ref Range    % Neutrophils 36 %    % Lymphocytes 48 %    % Monocytes 10 %    % Eosinophils 6 %    % Basophils 0 %    NRBCs per 100 WBC 2 (H) <=0 %    Absolute Neutrophils 3.0 1.6 - 8.3 10e3/uL    Absolute Lymphocytes 3.9 0.8 - 5.3 10e3/uL    Absolute Monocytes 0.8 0.0 - 1.3 10e3/uL    Absolute Eosinophils 0.5 0.0 - 0.7 10e3/uL    Absolute Basophils 0.0 0.0 - 0.2 10e3/uL    Absolute NRBCs 0.2 (H) <=0.0 10e3/uL    RBC Morphology Confirmed RBC Indices     Platelet Assessment  Automated Count Confirmed. Platelet morphology is normal.     Automated Count Confirmed. Platelet morphology is normal.   CBC with platelets and differential     Status: Abnormal    Narrative    The following orders were created for panel order CBC with platelets and differential.  Procedure                               Abnormality         Status                     ---------                               -----------         ------                     CBC with platelets and d...[[id number]]  Abnormal            Final result               Manual Differential[[id number]]          Abnormal            Final result                 Please view results for these tests on the individual orders.   Protein electrophoresis     Status: None (In process)    Narrative    The following orders were created for panel order Protein electrophoresis.  Procedure                               Abnormality         Status                     ---------                               -----------         ------                     Total Protein, Serum for...[[id number]]                      In process                 Protein Electrophoresis,...[[id number]]                      In process                   Please view results for these tests on the individual  orders.

## 2021-12-06 ENCOUNTER — OFFICE VISIT (OUTPATIENT)
Dept: PSYCHOLOGY | Facility: OTHER | Age: 56
End: 2021-12-06
Attending: COUNSELOR
Payer: COMMERCIAL

## 2021-12-06 DIAGNOSIS — F41.9 ANXIETY AND DEPRESSION: Primary | ICD-10-CM

## 2021-12-06 DIAGNOSIS — F32.A ANXIETY AND DEPRESSION: Primary | ICD-10-CM

## 2021-12-06 PROCEDURE — 90837 PSYTX W PT 60 MINUTES: CPT | Performed by: COUNSELOR

## 2021-12-06 ASSESSMENT — ANXIETY QUESTIONNAIRES
5. BEING SO RESTLESS THAT IT IS HARD TO SIT STILL: MORE THAN HALF THE DAYS
7. FEELING AFRAID AS IF SOMETHING AWFUL MIGHT HAPPEN: SEVERAL DAYS
GAD7 TOTAL SCORE: 13
2. NOT BEING ABLE TO STOP OR CONTROL WORRYING: MORE THAN HALF THE DAYS
1. FEELING NERVOUS, ANXIOUS, OR ON EDGE: MORE THAN HALF THE DAYS
6. BECOMING EASILY ANNOYED OR IRRITABLE: MORE THAN HALF THE DAYS
3. WORRYING TOO MUCH ABOUT DIFFERENT THINGS: MORE THAN HALF THE DAYS

## 2021-12-06 ASSESSMENT — PATIENT HEALTH QUESTIONNAIRE - PHQ9: 5. POOR APPETITE OR OVEREATING: MORE THAN HALF THE DAYS

## 2021-12-06 NOTE — PROGRESS NOTES
Progress Note    Client Name: Yamil Vanegas  Date: December 6, 2021         Service Type: Individual      Session Start Time: 1100  Session End Time: 1200      Session Length: 55     Attendees: Client              PHQ-9 :   PHQ-9 SCORE 11/23/2021   PHQ-9 Total Score 25      AILEEN-7 :    AILEEN-7 SCORE 11/23/2021   Total Score 21      Yamil was seen today as scheduled. He arrived early for his appointment. He left and returned on time. He presented as O  X 4,coherent,relevant, and in improved spirits. He denied S/H ideations. His memory appeared to be grossly intact. His insight and judgement appeared to be within a range acceptable for safety. Discuss the purpose of therapy and the assurance that he could be  straightforward with me.     The pt agreed to work on his racing and scattered thoughts,issues with his brother,his sons and his relocation to [place]. He will speak with the  this afternoon after we meet. The will also see the med provider this afternoon.      Progress Since Last Session (Related to Symptoms / Goals / Homework):   Symptoms: Improving denied S/H ideations    Homework: Completed in session     Current / Ongoing Stressors and Concerns:     Recent death of father.separation from wife,relocating to  [place]     Treatment Objective(s) Addressed in This Session:                  Stabilize mood     Intervention:       Motivational interviewing     Response to Interventions:                 Contemplating change      ASSESSMENT: Current Emotional / Mental Status (status of significant symptoms):   Risk status (Self / Other harm or suicidal ideation)   Client denies current fears or concerns for personal safety.   Client denies current or recent suicidal ideation or behaviors.   Client denies current or recent homicidal ideation or behaviors.   Client denies current or recent self injurious behavior or ideation.   Client denies other safety  concerns.   Recommended that patient call 911 or go to the local ED should there be a change in any of these risk factors.     Appearance:   Appropriate    Eye Contact:   Good    Psychomotor Behavior: Normal    Attitude:   Cooperative    Orientation:   All   Speech    Rate / Production: Talkative Normal     Volume:  Normal    Mood:    Anxious  Dysphoric   Affect:    Mood congruent   Thought Content:  Clear    Thought Form:  Coherent  Logical    Insight:    Fair      Diagnosis:   Major Depressive Disorder                                         Medication Compliance:   Yes     Changes in Health Issues:   None reported     Chemical Use Review:   Substance Use: Chemical use reviewed, no active concerns identified      Tobacco Use: The pt is daily smoker     Collateral Reports Completed:   Not Applicable    PLAN: (Client Tasks / Therapist Tasks / Other)    Refer to [facility]    JHONY De La Torre    The author of this note documented a reason for not sharing it with the patient.

## 2021-12-07 ASSESSMENT — ANXIETY QUESTIONNAIRES: GAD7 TOTAL SCORE: 13

## 2021-12-07 ASSESSMENT — PATIENT HEALTH QUESTIONNAIRE - PHQ9: SUM OF ALL RESPONSES TO PHQ QUESTIONS 1-9: 19

## 2021-12-10 ENCOUNTER — APPOINTMENT (OUTPATIENT)
Dept: LAB | Facility: OTHER | Age: 56
End: 2021-12-10
Attending: FAMILY MEDICINE
Payer: COMMERCIAL

## 2021-12-10 ENCOUNTER — OFFICE VISIT (OUTPATIENT)
Dept: FAMILY MEDICINE | Facility: OTHER | Age: 56
End: 2021-12-10
Attending: FAMILY MEDICINE
Payer: COMMERCIAL

## 2021-12-10 VITALS
TEMPERATURE: 96.9 F | SYSTOLIC BLOOD PRESSURE: 132 MMHG | HEIGHT: 68 IN | WEIGHT: 180 LBS | DIASTOLIC BLOOD PRESSURE: 70 MMHG | HEART RATE: 88 BPM | OXYGEN SATURATION: 96 % | BODY MASS INDEX: 27.28 KG/M2

## 2021-12-10 DIAGNOSIS — E78.5 DYSLIPIDEMIA: ICD-10-CM

## 2021-12-10 DIAGNOSIS — Z71.85 VACCINE COUNSELING: ICD-10-CM

## 2021-12-10 DIAGNOSIS — I10 PRIMARY HYPERTENSION: ICD-10-CM

## 2021-12-10 DIAGNOSIS — F17.200 TOBACCO USE DISORDER: ICD-10-CM

## 2021-12-10 DIAGNOSIS — R61 NIGHT SWEATS: ICD-10-CM

## 2021-12-10 DIAGNOSIS — Z12.11 SPECIAL SCREENING FOR MALIGNANT NEOPLASMS, COLON: ICD-10-CM

## 2021-12-10 DIAGNOSIS — F43.21 ADJUSTMENT DISORDER WITH DEPRESSED MOOD: ICD-10-CM

## 2021-12-10 DIAGNOSIS — Z00.00 ENCOUNTER FOR MEDICAL EXAMINATION TO ESTABLISH CARE: Primary | ICD-10-CM

## 2021-12-10 DIAGNOSIS — D64.9 ANEMIA, UNSPECIFIED TYPE: ICD-10-CM

## 2021-12-10 DIAGNOSIS — R70.0 ELEVATED ERYTHROCYTE SEDIMENTATION RATE: ICD-10-CM

## 2021-12-10 DIAGNOSIS — D72.820 LYMPHOCYTOSIS: ICD-10-CM

## 2021-12-10 DIAGNOSIS — R77.9 ELEVATED SERUM PROTEIN LEVEL: ICD-10-CM

## 2021-12-10 DIAGNOSIS — Z12.5 SCREENING FOR PROSTATE CANCER: ICD-10-CM

## 2021-12-10 LAB
ALBUMIN SERPL-MCNC: 2.7 G/DL (ref 3.4–5)
ALP SERPL-CCNC: 55 U/L (ref 40–150)
ALT SERPL W P-5'-P-CCNC: 28 U/L (ref 0–70)
ANION GAP SERPL CALCULATED.3IONS-SCNC: 6 MMOL/L (ref 3–14)
AST SERPL W P-5'-P-CCNC: 15 U/L (ref 0–45)
BASOPHILS # BLD MANUAL: 0 10E3/UL (ref 0–0.2)
BASOPHILS NFR BLD MANUAL: 0 %
BILIRUB SERPL-MCNC: 0.3 MG/DL (ref 0.2–1.3)
BUN SERPL-MCNC: 12 MG/DL (ref 7–30)
CALCIUM SERPL-MCNC: 9.3 MG/DL (ref 8.5–10.1)
CHLORIDE BLD-SCNC: 106 MMOL/L (ref 94–109)
CHOLEST SERPL-MCNC: 105 MG/DL
CO2 SERPL-SCNC: 27 MMOL/L (ref 20–32)
CREAT SERPL-MCNC: 0.78 MG/DL (ref 0.66–1.25)
CRP SERPL-MCNC: 3.1 MG/L (ref 0–8)
DAT, ANTI-IGG, C3: NORMAL
EOSINOPHIL # BLD MANUAL: 0.5 10E3/UL (ref 0–0.7)
EOSINOPHIL NFR BLD MANUAL: 6 %
ERYTHROCYTE [DISTWIDTH] IN BLOOD BY AUTOMATED COUNT: 16 % (ref 10–15)
ERYTHROCYTE [SEDIMENTATION RATE] IN BLOOD BY WESTERGREN METHOD: 117 MM/HR (ref 0–20)
FASTING STATUS PATIENT QL REPORTED: NO
GFR SERPL CREATININE-BSD FRML MDRD: >90 ML/MIN/1.73M2
GLUCOSE BLD-MCNC: 115 MG/DL (ref 70–99)
HCT VFR BLD AUTO: 34.8 % (ref 40–53)
HDLC SERPL-MCNC: 39 MG/DL
HGB BLD-MCNC: 11.4 G/DL (ref 13.3–17.7)
LDLC SERPL CALC-MCNC: 36 MG/DL
LYMPHOCYTES # BLD MANUAL: 3.9 10E3/UL (ref 0.8–5.3)
LYMPHOCYTES NFR BLD MANUAL: 48 %
MCH RBC QN AUTO: 30.8 PG (ref 26.5–33)
MCHC RBC AUTO-ENTMCNC: 32.8 G/DL (ref 31.5–36.5)
MCV RBC AUTO: 94 FL (ref 78–100)
MONOCYTES # BLD MANUAL: 0.8 10E3/UL (ref 0–1.3)
MONOCYTES NFR BLD MANUAL: 10 %
NEUTROPHILS # BLD MANUAL: 3 10E3/UL (ref 1.6–8.3)
NEUTROPHILS NFR BLD MANUAL: 36 %
NONHDLC SERPL-MCNC: 66 MG/DL
NRBC # BLD AUTO: 0.2 10E3/UL
NRBC BLD MANUAL-RTO: 2 %
PLAT MORPH BLD: ABNORMAL
PLATELET # BLD AUTO: 240 10E3/UL (ref 150–450)
POTASSIUM BLD-SCNC: 3.8 MMOL/L (ref 3.4–5.3)
PROT SERPL-MCNC: 9.7 G/DL (ref 6.8–8.8)
PSA SERPL-MCNC: 0.92 UG/L (ref 0–4)
RBC # BLD AUTO: 3.7 10E6/UL (ref 4.4–5.9)
RBC MORPH BLD: ABNORMAL
SODIUM SERPL-SCNC: 139 MMOL/L (ref 133–144)
SPECIMEN EXPIRATION DATE: NORMAL
TOTAL PROTEIN SERUM FOR ELP: 10 G/DL (ref 6.8–8.8)
TRIGL SERPL-MCNC: 149 MG/DL
WBC # BLD AUTO: 8.2 10E3/UL (ref 4–11)

## 2021-12-10 PROCEDURE — 86803 HEPATITIS C AB TEST: CPT | Performed by: FAMILY MEDICINE

## 2021-12-10 PROCEDURE — 84165 PROTEIN E-PHORESIS SERUM: CPT | Performed by: PATHOLOGY

## 2021-12-10 PROCEDURE — 88184 FLOWCYTOMETRY/ TC 1 MARKER: CPT | Performed by: FAMILY MEDICINE

## 2021-12-10 PROCEDURE — 80053 COMPREHEN METABOLIC PANEL: CPT | Performed by: FAMILY MEDICINE

## 2021-12-10 PROCEDURE — 85027 COMPLETE CBC AUTOMATED: CPT | Performed by: FAMILY MEDICINE

## 2021-12-10 PROCEDURE — 88185 FLOWCYTOMETRY/TC ADD-ON: CPT | Performed by: FAMILY MEDICINE

## 2021-12-10 PROCEDURE — 36415 COLL VENOUS BLD VENIPUNCTURE: CPT | Performed by: FAMILY MEDICINE

## 2021-12-10 PROCEDURE — 85652 RBC SED RATE AUTOMATED: CPT | Performed by: FAMILY MEDICINE

## 2021-12-10 PROCEDURE — 88184 FLOWCYTOMETRY/ TC 1 MARKER: CPT | Performed by: STUDENT IN AN ORGANIZED HEALTH CARE EDUCATION/TRAINING PROGRAM

## 2021-12-10 PROCEDURE — 84165 PROTEIN E-PHORESIS SERUM: CPT | Mod: 26 | Performed by: PATHOLOGY

## 2021-12-10 PROCEDURE — 86140 C-REACTIVE PROTEIN: CPT | Performed by: FAMILY MEDICINE

## 2021-12-10 PROCEDURE — 99215 OFFICE O/P EST HI 40 MIN: CPT | Performed by: FAMILY MEDICINE

## 2021-12-10 PROCEDURE — 86880 COOMBS TEST DIRECT: CPT | Performed by: FAMILY MEDICINE

## 2021-12-10 PROCEDURE — 87389 HIV-1 AG W/HIV-1&-2 AB AG IA: CPT | Performed by: FAMILY MEDICINE

## 2021-12-10 PROCEDURE — G0103 PSA SCREENING: HCPCS | Performed by: FAMILY MEDICINE

## 2021-12-10 PROCEDURE — 88189 FLOWCYTOMETRY/READ 16 & >: CPT | Performed by: STUDENT IN AN ORGANIZED HEALTH CARE EDUCATION/TRAINING PROGRAM

## 2021-12-10 PROCEDURE — 80061 LIPID PANEL: CPT | Performed by: FAMILY MEDICINE

## 2021-12-10 RX ORDER — BUPROPION HYDROCHLORIDE 150 MG/1
150 TABLET ORAL EVERY MORNING
COMMUNITY
Start: 2021-12-06

## 2021-12-10 ASSESSMENT — ANXIETY QUESTIONNAIRES
3. WORRYING TOO MUCH ABOUT DIFFERENT THINGS: SEVERAL DAYS
2. NOT BEING ABLE TO STOP OR CONTROL WORRYING: SEVERAL DAYS
5. BEING SO RESTLESS THAT IT IS HARD TO SIT STILL: SEVERAL DAYS
GAD7 TOTAL SCORE: 7
IF YOU CHECKED OFF ANY PROBLEMS ON THIS QUESTIONNAIRE, HOW DIFFICULT HAVE THESE PROBLEMS MADE IT FOR YOU TO DO YOUR WORK, TAKE CARE OF THINGS AT HOME, OR GET ALONG WITH OTHER PEOPLE: VERY DIFFICULT
6. BECOMING EASILY ANNOYED OR IRRITABLE: SEVERAL DAYS
1. FEELING NERVOUS, ANXIOUS, OR ON EDGE: SEVERAL DAYS
4. TROUBLE RELAXING: SEVERAL DAYS
7. FEELING AFRAID AS IF SOMETHING AWFUL MIGHT HAPPEN: SEVERAL DAYS

## 2021-12-10 ASSESSMENT — PATIENT HEALTH QUESTIONNAIRE - PHQ9: SUM OF ALL RESPONSES TO PHQ QUESTIONS 1-9: 10

## 2021-12-10 ASSESSMENT — MIFFLIN-ST. JEOR: SCORE: 1615.73

## 2021-12-10 ASSESSMENT — PAIN SCALES - GENERAL: PAINLEVEL: MODERATE PAIN (5)

## 2021-12-10 NOTE — NURSING NOTE
"Chief Complaint   Patient presents with     Establish Care       Initial BP (!) 146/80   Pulse 88   Temp 96.9  F (36.1  C)   Ht 1.735 m (5' 8.3\")   Wt 81.6 kg (180 lb)   SpO2 96%   BMI 27.13 kg/m   Estimated body mass index is 27.13 kg/m  as calculated from the following:    Height as of this encounter: 1.735 m (5' 8.3\").    Weight as of this encounter: 81.6 kg (180 lb).  Medication Reconciliation: complete  Bhupendra Hernandez LPN  "

## 2021-12-11 ASSESSMENT — ANXIETY QUESTIONNAIRES: GAD7 TOTAL SCORE: 7

## 2021-12-13 ENCOUNTER — OFFICE VISIT (OUTPATIENT)
Dept: PSYCHOLOGY | Facility: OTHER | Age: 56
End: 2021-12-13
Attending: COUNSELOR
Payer: COMMERCIAL

## 2021-12-13 DIAGNOSIS — F32.A ANXIETY AND DEPRESSION: Primary | ICD-10-CM

## 2021-12-13 DIAGNOSIS — R77.9 ELEVATED SERUM PROTEIN LEVEL: Primary | ICD-10-CM

## 2021-12-13 DIAGNOSIS — F41.9 ANXIETY AND DEPRESSION: Primary | ICD-10-CM

## 2021-12-13 DIAGNOSIS — R70.0 ELEVATED ERYTHROCYTE SEDIMENTATION RATE: ICD-10-CM

## 2021-12-13 DIAGNOSIS — R61 NIGHT SWEATS: ICD-10-CM

## 2021-12-13 DIAGNOSIS — D72.820 LYMPHOCYTOSIS: ICD-10-CM

## 2021-12-13 LAB
ALBUMIN SERPL ELPH-MCNC: 3.8 G/DL (ref 3.7–5.1)
ALPHA1 GLOB SERPL ELPH-MCNC: 0.4 G/DL (ref 0.2–0.4)
ALPHA2 GLOB SERPL ELPH-MCNC: 0.9 G/DL (ref 0.5–0.9)
B-GLOBULIN SERPL ELPH-MCNC: 4.4 G/DL (ref 0.6–1)
GAMMA GLOB SERPL ELPH-MCNC: 0.5 G/DL (ref 0.7–1.6)
HCV AB SERPL QL IA: ABNORMAL
HIV 1+2 AB+HIV1 P24 AG SERPL QL IA: NONREACTIVE
M PROTEIN SERPL ELPH-MCNC: 3.7 G/DL
PATH REPORT.COMMENTS IMP SPEC: ABNORMAL
PATH REPORT.COMMENTS IMP SPEC: YES
PATH REPORT.FINAL DX SPEC: ABNORMAL
PATH REPORT.MICROSCOPIC SPEC OTHER STN: ABNORMAL
PATH REPORT.RELEVANT HX SPEC: ABNORMAL
PROT PATTERN SERPL ELPH-IMP: ABNORMAL

## 2021-12-13 PROCEDURE — 90837 PSYTX W PT 60 MINUTES: CPT | Performed by: COUNSELOR

## 2021-12-14 ENCOUNTER — LAB (OUTPATIENT)
Dept: LAB | Facility: OTHER | Age: 56
End: 2021-12-14
Payer: COMMERCIAL

## 2021-12-14 DIAGNOSIS — D72.820 LYMPHOCYTOSIS: ICD-10-CM

## 2021-12-14 DIAGNOSIS — R77.9 ELEVATED SERUM PROTEIN LEVEL: ICD-10-CM

## 2021-12-14 DIAGNOSIS — R70.0 ELEVATED ERYTHROCYTE SEDIMENTATION RATE: ICD-10-CM

## 2021-12-14 DIAGNOSIS — R61 NIGHT SWEATS: ICD-10-CM

## 2021-12-14 PROCEDURE — 86334 IMMUNOFIX E-PHORESIS SERUM: CPT | Mod: 26 | Performed by: PATHOLOGY

## 2021-12-14 PROCEDURE — 86334 IMMUNOFIX E-PHORESIS SERUM: CPT

## 2021-12-14 PROCEDURE — 36415 COLL VENOUS BLD VENIPUNCTURE: CPT

## 2021-12-14 PROCEDURE — 86335 IMMUNFIX E-PHORSIS/URINE/CSF: CPT

## 2021-12-14 PROCEDURE — 86335 IMMUNFIX E-PHORSIS/URINE/CSF: CPT | Mod: 26 | Performed by: PATHOLOGY

## 2021-12-14 ASSESSMENT — ANXIETY QUESTIONNAIRES
IF YOU CHECKED OFF ANY PROBLEMS ON THIS QUESTIONNAIRE, HOW DIFFICULT HAVE THESE PROBLEMS MADE IT FOR YOU TO DO YOUR WORK, TAKE CARE OF THINGS AT HOME, OR GET ALONG WITH OTHER PEOPLE: SOMEWHAT DIFFICULT
2. NOT BEING ABLE TO STOP OR CONTROL WORRYING: SEVERAL DAYS
1. FEELING NERVOUS, ANXIOUS, OR ON EDGE: SEVERAL DAYS
GAD7 TOTAL SCORE: 7
6. BECOMING EASILY ANNOYED OR IRRITABLE: SEVERAL DAYS
5. BEING SO RESTLESS THAT IT IS HARD TO SIT STILL: SEVERAL DAYS
3. WORRYING TOO MUCH ABOUT DIFFERENT THINGS: SEVERAL DAYS
7. FEELING AFRAID AS IF SOMETHING AWFUL MIGHT HAPPEN: SEVERAL DAYS

## 2021-12-14 ASSESSMENT — PATIENT HEALTH QUESTIONNAIRE - PHQ9
5. POOR APPETITE OR OVEREATING: SEVERAL DAYS
SUM OF ALL RESPONSES TO PHQ QUESTIONS 1-9: 8

## 2021-12-14 NOTE — PROGRESS NOTES
Progress Note    Client Name: Yamil Vanegas  Date: December 13, 2021         Service Type: Individual      Session Start Time: 1100  Session End Time: 1200      Session Length: 55         Attendees: Client      PHQ-9 :   PHQ-9 SCORE 11/23/2021 12/6/2021 12/10/2021   PHQ-9 Total Score 25 19 10      AILEEN-7 :    AILEEN-7 SCORE 11/23/2021 12/6/2021 12/10/2021   Total Score 21 13 7           DATA     Yamil was seen today for a schedule visit.He presented as O x 4,coherent,relevant and pleasant. Yamil denied S/H ideations. His memory was intact. His insight and judgement appeared to be within a range acceptable for safety.     Yamil reported that he has connected with [facility]. He has been started on medication and it appears to be helping.Discussed with the pt plans. He has intentions of staying in the area. O. K .being  from wife because he feels that he needs to get some things together. His priorities included getting a job,finding housing, and perhaps resettling his wife to [place]. 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 He was reminded that he couldn't use cannabis and drive commercially. Concerned about what he considers hoarding by his wife.      Progress Since Last Session (Related to Symptoms / Goals / Homework):   Symptoms: Improving Therapy and medication appear to br helping.    Homework: Completed in session     Current / Ongoing Stressors and Concerns:                 Resettlement to [facility]     Treatment Objective(s) Addressed in This Session:      Improved mood.Decreased anxiety.     Intervention:                 Psycho education. Motivational interviewing     Response to Interventions:                  Interactive listening     ASSESSMENT: Current Emotional / Mental Status (status of significant symptoms):   Risk status (Self / Other harm or suicidal ideation)   Client denies current fears or concerns  for personal safety.   Client denies current or recent suicidal ideation or behaviors.   Client denies current or recent homicidal ideation or behaviors.   Client denies current or recent self injurious behavior or ideation.   Client denies other safety concerns.   Recommended that patient call 911 or go to the local ED should there be a change in any of these risk factors.     Appearance:   Appropriate    Eye Contact:   Good    Psychomotor Behavior: Normal    Attitude:   Cooperative    Orientation:   All   Speech    Rate / Production: Normal     Volume:  Normal    Mood:    Anxious    Affect:    Mood congruent   Thought Content:  Clear    Thought Form:  Coherent  Logical    Insight:    Fair      Diagnosis: Major Depressive Disorder        Medication Compliance:   Yes     Changes in Health Issues:   None reported     Chemical Use Review:   Substance Use: Chemical use reviewed, no active concerns identified       Tobacco Use: The pt smokes daily     Collateral Reports Completed:   Not Applicable    PLAN: (Client Tasks / Therapist Tasks / Other)     Complete treatment plan.    Lyle Jeffrey [facility]     The author of this note documented a reason for not sharing it with the patient.    .

## 2021-12-15 ENCOUNTER — OFFICE VISIT (OUTPATIENT)
Dept: FAMILY MEDICINE | Facility: OTHER | Age: 56
End: 2021-12-15
Attending: FAMILY MEDICINE
Payer: COMMERCIAL

## 2021-12-15 VITALS
HEART RATE: 85 BPM | SYSTOLIC BLOOD PRESSURE: 112 MMHG | TEMPERATURE: 98 F | HEIGHT: 68 IN | OXYGEN SATURATION: 95 % | DIASTOLIC BLOOD PRESSURE: 64 MMHG | WEIGHT: 180 LBS | BODY MASS INDEX: 27.28 KG/M2

## 2021-12-15 DIAGNOSIS — D64.9 ANEMIA, UNSPECIFIED TYPE: ICD-10-CM

## 2021-12-15 DIAGNOSIS — I10 PRIMARY HYPERTENSION: Primary | ICD-10-CM

## 2021-12-15 DIAGNOSIS — E78.5 DYSLIPIDEMIA: ICD-10-CM

## 2021-12-15 DIAGNOSIS — D72.820 LYMPHOCYTOSIS: ICD-10-CM

## 2021-12-15 DIAGNOSIS — F41.9 ANXIETY: ICD-10-CM

## 2021-12-15 DIAGNOSIS — R77.9 ELEVATED SERUM PROTEIN LEVEL: ICD-10-CM

## 2021-12-15 DIAGNOSIS — F32.A DEPRESSION, UNSPECIFIED DEPRESSION TYPE: ICD-10-CM

## 2021-12-15 DIAGNOSIS — R61 NIGHT SWEATS: ICD-10-CM

## 2021-12-15 PROBLEM — B18.2 HEPATITIS C, CHRONIC (H): Status: ACTIVE | Noted: 2021-12-15

## 2021-12-15 LAB
PROT ELPH PNL UR ELPH: NORMAL
PROT PATTERN SERPL IFE-IMP: NORMAL

## 2021-12-15 PROCEDURE — 99214 OFFICE O/P EST MOD 30 MIN: CPT | Performed by: FAMILY MEDICINE

## 2021-12-15 RX ORDER — ATORVASTATIN CALCIUM 20 MG/1
20 TABLET, FILM COATED ORAL DAILY
Qty: 90 TABLET | Refills: 1 | Status: SHIPPED | OUTPATIENT
Start: 2021-12-15

## 2021-12-15 RX ORDER — LOSARTAN POTASSIUM 50 MG/1
50 TABLET ORAL DAILY
Qty: 90 TABLET | Refills: 1 | Status: SHIPPED | OUTPATIENT
Start: 2021-12-15

## 2021-12-15 ASSESSMENT — ANXIETY QUESTIONNAIRES: GAD7 TOTAL SCORE: 7

## 2021-12-15 ASSESSMENT — PAIN SCALES - GENERAL: PAINLEVEL: MODERATE PAIN (5)

## 2021-12-15 ASSESSMENT — MIFFLIN-ST. JEOR: SCORE: 1615.73

## 2021-12-15 NOTE — NURSING NOTE
"Chief Complaint   Patient presents with     Chronic Disease Management       Initial /64   Pulse 85   Temp 98  F (36.7  C)   Ht 1.735 m (5' 8.3\")   Wt 81.6 kg (180 lb)   SpO2 95%   BMI 27.13 kg/m   Estimated body mass index is 27.13 kg/m  as calculated from the following:    Height as of this encounter: 1.735 m (5' 8.3\").    Weight as of this encounter: 81.6 kg (180 lb).  Medication Reconciliation: complete  Bhupendra Hernandez LPN  "

## 2021-12-15 NOTE — PROGRESS NOTES
"  Assessment & Plan       ICD-10-CM    1. Primary hypertension  I10 losartan (COZAAR) 50 MG tablet   2. Anxiety  F41.9 sertraline (ZOLOFT) 50 MG tablet   3. Depression, unspecified depression type  F32.A sertraline (ZOLOFT) 50 MG tablet   4. Dyslipidemia  E78.5 atorvastatin (LIPITOR) 20 MG tablet   5. Lymphocytosis  D72.820 CT Abdomen Pelvis w Contrast     CT Chest w Contrast   6. Night sweats  R61 CT Abdomen Pelvis w Contrast     CT Chest w Contrast   7. Elevated serum protein level  R77.9 CT Abdomen Pelvis w Contrast     CT Chest w Contrast   8. Anemia, unspecified type  D64.9 CT Abdomen Pelvis w Contrast     CT Chest w Contrast   nice discussion with pt /sister and wife. Discussed that this probably started several years ago with some mild abnormal labs back then. Discussed possible leukemia/lymphoma and maybe multiple myloma. Discussed getting CT scan chest to pelvis. Discussed referring to Hem/Onc-- pt and wife considering [place] or here. Pt living up here and was planning on going to school. Wife is in cities. They wiell get back to me next week on referral. All questions answered    Review of external notes as documented elsewhere in note  Assessment requiring an independent historian(s) - family - sister and wife   Diagnosis or treatment significantly limited by social determinants of health - lives in cities but staying [place]   Ordering of each unique test  32 minutes spent on the date of the encounter doing chart review, history and exam, documentation and further activities per the note       BMI:   Estimated body mass index is 27.13 kg/m  as calculated from the following:    Height as of this encounter: 1.735 m (5' 8.3\").    Weight as of this encounter: 81.6 kg (180 lb).           No follow-ups on file.    Сергей Macias MD  [facility] - LUIS Lobo is a 56 year old who presents for the following health issues     HPI     Abnormal Lab  Follow-up    Labs are abnormal " "and suggestive of possible malignancy   Asked pt to come in and he brought sister and wife is on cell phone.     Pt needs CT scans to  Look for organomegaly or lymphadenopathy   {          Review of Systems   Constitutional, HEENT, cardiovascular, pulmonary, gi and gu systems are negative, except as otherwise noted.      Objective    /64   Pulse 85   Temp 98  F (36.7  C)   Ht 1.735 m (5' 8.3\")   Wt 81.6 kg (180 lb)   SpO2 95%   BMI 27.13 kg/m    Body mass index is 27.13 kg/m .  Physical Exam   GENERAL: healthy, alert and no distress      Labs reviewed with pt some                 "

## 2021-12-20 ENCOUNTER — OFFICE VISIT (OUTPATIENT)
Dept: PSYCHOLOGY | Facility: OTHER | Age: 56
End: 2021-12-20
Attending: COUNSELOR
Payer: COMMERCIAL

## 2021-12-20 ENCOUNTER — HOSPITAL ENCOUNTER (OUTPATIENT)
Dept: CT IMAGING | Facility: HOSPITAL | Age: 56
Discharge: HOME | End: 2021-12-20
Attending: FAMILY MEDICINE | Admitting: FAMILY MEDICINE
Payer: COMMERCIAL

## 2021-12-20 DIAGNOSIS — F41.9 ANXIETY AND DEPRESSION: Primary | ICD-10-CM

## 2021-12-20 DIAGNOSIS — R61 NIGHT SWEATS: ICD-10-CM

## 2021-12-20 DIAGNOSIS — R77.9 ELEVATED SERUM PROTEIN LEVEL: ICD-10-CM

## 2021-12-20 DIAGNOSIS — D72.820 LYMPHOCYTOSIS: ICD-10-CM

## 2021-12-20 DIAGNOSIS — F32.A ANXIETY AND DEPRESSION: Primary | ICD-10-CM

## 2021-12-20 DIAGNOSIS — D64.9 ANEMIA, UNSPECIFIED TYPE: ICD-10-CM

## 2021-12-20 PROCEDURE — 90837 PSYTX W PT 60 MINUTES: CPT | Performed by: COUNSELOR

## 2021-12-20 PROCEDURE — 250N000011 HC RX IP 250 OP 636: Performed by: RADIOLOGY

## 2021-12-20 PROCEDURE — 71260 CT THORAX DX C+: CPT

## 2021-12-20 RX ORDER — IOPAMIDOL 755 MG/ML
89 INJECTION, SOLUTION INTRAVASCULAR ONCE
Status: COMPLETED | OUTPATIENT
Start: 2021-12-20 | End: 2021-12-20

## 2021-12-20 RX ADMIN — IOPAMIDOL 89 ML: 755 INJECTION, SOLUTION INTRAVENOUS at 09:29

## 2021-12-20 ASSESSMENT — ANXIETY QUESTIONNAIRES
5. BEING SO RESTLESS THAT IT IS HARD TO SIT STILL: NOT AT ALL
2. NOT BEING ABLE TO STOP OR CONTROL WORRYING: NOT AT ALL
7. FEELING AFRAID AS IF SOMETHING AWFUL MIGHT HAPPEN: NOT AT ALL
3. WORRYING TOO MUCH ABOUT DIFFERENT THINGS: NOT AT ALL
6. BECOMING EASILY ANNOYED OR IRRITABLE: NOT AT ALL
1. FEELING NERVOUS, ANXIOUS, OR ON EDGE: NOT AT ALL
GAD7 TOTAL SCORE: 0
IF YOU CHECKED OFF ANY PROBLEMS ON THIS QUESTIONNAIRE, HOW DIFFICULT HAVE THESE PROBLEMS MADE IT FOR YOU TO DO YOUR WORK, TAKE CARE OF THINGS AT HOME, OR GET ALONG WITH OTHER PEOPLE: NOT DIFFICULT AT ALL

## 2021-12-20 ASSESSMENT — PATIENT HEALTH QUESTIONNAIRE - PHQ9
5. POOR APPETITE OR OVEREATING: NOT AT ALL
SUM OF ALL RESPONSES TO PHQ QUESTIONS 1-9: 0

## 2021-12-20 NOTE — PROGRESS NOTES
Progress Note    Client Name: Yamil Vanegas  Date: December 20, 2021         Service Type: Individual      Session Start Time: 1100  Session End Time:1200      Session Length: 55     Attendees: Client        PHQ-9 :   PHQ-9 SCORE 12/6/2021 12/10/2021 12/14/2021   PHQ-9 Total Score 19 10 8      AILEEN-7 :    AILEEN-7 SCORE 12/6/2021 12/10/2021 12/14/2021   Total Score 13 7 7           DATA        Yamil presented for his scheduled appt on time.He was O x 4,coherent.relevant and in good spirits. He denied S/H ideations. His memory was intact   The pt insight and judgement appeared to be within a range for safety,Scott reported substantial improvement from our first appointment.    The pt reported that he had a health concern but he expressed no significant worry.He said that he will return to [facility] for [place] and will return to the [place] area for the  New Years' holiday. Sounded hopeful, Yamil was encouragedt to have a discussion with his wife about what she liked about him.He agreed to talk to me by phone on December 28.           Progress Since Last Session (Related to Symptoms / Goals / Homework):   Symptoms: Improving States substantial improvement    Homework: Completed in session     Current / Ongoing Stressors and Concerns:                Reconciliation with wife; Health concerns     Treatment Objective(s) Addressed in This Session:       Improved mood ;Speaking with wife     Intervention:                 Motivational interviewing     Response to Interventions:                   Interactive listening     ASSESSMENT: Current Emotional / Mental Status (status of significant symptoms):   Risk status (Self / Other harm or suicidal ideation)   Client denies current fears or concerns for personal safety.   Client denies current or recent suicidal ideation or behaviors.   Client denies current or recent homicidal ideation or behaviors.   Client denies current or recent self  injurious behavior or ideation.   Client denies other safety concerns.   Recommended that patient call 911 or go to the local ED should there be a change in any of these risk factors.     Appearance:   Appropriate    Eye Contact:   Good    Psychomotor Behavior: Normal    Attitude:   Cooperative    Orientation:   All   Speech    Rate / Production: Normal     Volume:  Normal    Mood:    Anxious    Affect:    Mood congruent   Thought Content:  Clear    Thought Form:  Coherent  Logical    Insight:    Fair        Medication Compliance:   Yes     Changes in Health Issues:   None reported      Chemical Use Review:     Substance Use: Chemical use reviewed, no active concerns identified      Tobacco Use: The pt smokes contemplating stoppong     Collateral Reports Completed:   Not Applicable    PLAN: (Client Tasks / Therapist Tasks / Other)     Monitor pt. Conversation with wife over the holidays.    Lyle Jeffrey [facility]    The author of this note documented a reason for not sharing it with the patient.

## 2021-12-21 ASSESSMENT — ANXIETY QUESTIONNAIRES: GAD7 TOTAL SCORE: 0

## 2021-12-22 DIAGNOSIS — D72.820 LYMPHOCYTOSIS: ICD-10-CM

## 2021-12-22 DIAGNOSIS — D64.9 ANEMIA, UNSPECIFIED TYPE: ICD-10-CM

## 2021-12-22 DIAGNOSIS — R77.9 ELEVATED SERUM PROTEIN LEVEL: Primary | ICD-10-CM

## 2021-12-22 DIAGNOSIS — R76.8 ELEVATED SERUM IMMUNOGLOBULIN FREE LIGHT CHAIN LEVEL: ICD-10-CM

## 2021-12-28 ENCOUNTER — VIRTUAL VISIT (OUTPATIENT)
Dept: PSYCHOLOGY | Facility: OTHER | Age: 56
End: 2021-12-28
Attending: COUNSELOR
Payer: COMMERCIAL

## 2021-12-28 DIAGNOSIS — F32.A ANXIETY AND DEPRESSION: Primary | ICD-10-CM

## 2021-12-28 DIAGNOSIS — F41.9 ANXIETY AND DEPRESSION: Primary | ICD-10-CM

## 2021-12-28 PROCEDURE — 90832 PSYTX W PT 30 MINUTES: CPT | Mod: 95 | Performed by: COUNSELOR

## 2021-12-28 NOTE — PROGRESS NOTES
"[facility]    Behavioral Health Progress Note    December 28, 2021      Patient Name: Yamil Vanegas         Service Type: Individual           Session Start Time:  1100  Session End Time: 1130      Session Length: 16 - 37                  Phone call duration: 25 minutes      Attendees: Patient      Yamil Vanegas is a 56 year old male who is being evaluated via a billable telephone visit.      The patient has been notified of following:     \"This telephone visit will be conducted via a call between you and your physician/provider. We have found that certain health care needs can be provided without the need for a physical exam.  This service lets us provide the care you need with a short phone conversation.  If a prescription is necessary we can send it directly to your pharmacy.  If lab work is needed we can place an order for that and you can then stop by our lab to have the test done at a later time.    If during the course of the call the physician/provider feels a telephone visit is not appropriate, you will not be charged for this service.\"     Patient has given verbal consent for Telephone visit?  Yes    Yamil Vanegas complains of    Chief Complaint   Patient presents with     Depression     Yamil was contacted in the [facility],. He presented as O x 4,coherent,relevant and sounding not as lively as when he was previously spoken to. He denied S/H ideations. His memory appeared to be intact.His insight and judgement were within a range acceptable for safety.  He has returned home to  [place]. He reported that he will come up for the New Years's weekend.Has changed his mind about moving to [place] for the time being. Will stay in [place] so that he might see an oncologist there, Suspect that he might have leukemia.    The pt sounded down. Will speak with wife on Jan,29.      Treatment Objective(s) Addressed in This Session:  Target Behavior(s):  Depressed Mood: Decrease frequency and intensity of " feeling down, depressed, hopeless        Current Stressors / Issues:    Some concern about his health          Progress on Treatment Objective:    Evasive about his feelings        Previous PHQ-9:   PHQ 12/10/2021 12/14/2021 12/20/2021   PHQ-9 Total Score 10 8 0   Q9: Thoughts of better off dead/self-harm past 2 weeks Several days Not at all Not at all     Previous AILEEN-7:   AILEEN-7 SCORE 12/10/2021 12/14/2021 12/20/2021   Total Score 7 7 0             Medication Review:  No changes to current psychiatric medication(s)    Medication Compliance:  Yes    Changes in Health Issues:      Concerned about his medical condition    Chemical Use Review:   Substance Use: Chemical use reviewed, no active concerns identified      Tobacco Use: Daily smoker        Assessment: Current Emotional / Mental Status (status of significant symptoms):    Risk status (Self / Other harm or suicidal ideation)  Patient denies current fears or concerns for personal safety.  Patient denies current or recent suicidal ideation or behaviors.  Patient denies current or recent homicidal ideation or behaviors.  Patient denies current or recent self injurious behavior or ideation.  Patient denies other safety concerns.  A safety and risk management plan has not been developed at this time, however patient was encouraged to call [place] [phone] should there be a change in any of these risk factors.      Attitude:   Cooperative   Orientation:   All  Speech   Rate / Production: Normal    Volume:  Normal   Mood:    Anxious  Dysphoric  Thought Content:  Clear   Thought Form:  Coherent  Logical   Insight:    Fair     Diagnoses:  1. Anxiety and depression        Collateral Reports Completed:  Not Applicable    Plan: (Homework, other):    Next contact to see whether the pt wants continued telephone calls.    Lyle Jeffrey [facility]

## 2022-01-03 ENCOUNTER — VIRTUAL VISIT (OUTPATIENT)
Dept: PSYCHOLOGY | Facility: OTHER | Age: 57
End: 2022-01-03
Attending: COUNSELOR
Payer: COMMERCIAL

## 2022-01-03 DIAGNOSIS — F41.9 ANXIETY AND DEPRESSION: Primary | ICD-10-CM

## 2022-01-03 DIAGNOSIS — F32.A ANXIETY AND DEPRESSION: Primary | ICD-10-CM

## 2022-01-03 PROCEDURE — 90832 PSYTX W PT 30 MINUTES: CPT | Mod: 95 | Performed by: COUNSELOR

## 2022-01-03 NOTE — PROGRESS NOTES
"[facility]    Behavioral Health Progress Note    January 3, 2022      Patient Name: Yamil Vanegas         Service Type: Phone Visit           Session Start Time:  1100  Session End Time: 1130      Session Length: 16 - 37                  Phone call duration: 20 minutes      Attendees: Patient      Yamil Vanegas is a 56 year old male who is being evaluated via a billable telephone visit.      The patient has been notified of following:     \"This telephone visit will be conducted via a call between you and your physician/provider. We have found that certain health care needs can be provided without the need for a physical exam.  This service lets us provide the care you need with a short phone conversation.  If a prescription is necessary we can send it directly to your pharmacy.  If lab work is needed we can place an order for that and you can then stop by our lab to have the test done at a later time.    If during the course of the call the physician/provider feels a telephone visit is not appropriate, you will not be charged for this service.\"     Patient has given verbal consent for Telephone visit?  Yes    Yamil Vanegas complains of    Chief Complaint   Patient presents with     Depression       Yamil was contacted at his home in [place]. He presented as O x 4,coherrenete,relevant and cooperative. He denied S/H thoughts now or recently. His memory was intact . His insight and judgement were within a range acceptable for safety. He reported that he had come to [place] for the New Year holiday and had a good time.He says that he plans to stay in [facility] for mow. He has an appointment scheduled with an oncologist on Jan 14th. He is not worried about the finding of his blood work,he believes it will be  O.k. Hoping to improve his communication with his life,He was encouraged at expressing himself honestly with his wife,     Plan: establish a safe place for the pt to express himself without " condemnation.          Treatment Objective(s) Addressed in This Session:  Target Behavior(s):  Depressed Mood: Decrease frequency and intensity of feeling down, depressed, hopeless        Current Stressors / Issues:    Domestic concerns,lack of employment recent death of father        Progress on Treatment Objective:    Questionable. Reports that he no longer has suicidal thoughts.              Previous PHQ-9:   PHQ 12/10/2021 12/14/2021 12/20/2021   PHQ-9 Total Score 10 8 0   Q9: Thoughts of better off dead/self-harm past 2 weeks Several days Not at all Not at all     Previous AILEEN-7:   AILEEN-7 SCORE 12/10/2021 12/14/2021 12/20/2021   Total Score 7 7 0             Medication Review:  No changes to current psychiatric medication(s)    Medication Compliance:  Yes    Changes in Health Issues:   None reported    Chemical Use Review:   Substance Use: Chemical use reviewed, no active concerns identified      Tobacco Use: The pt is a daily smoker        Assessment: Current Emotional / Mental Status (status of significant symptoms):    Risk status (Self / Other harm or suicidal ideation)  Patient denies current fears or concerns for personal safety.  Patient denies current or recent suicidal ideation or behaviors.  Patient denies current or recent homicidal ideation or behaviors.  Patient denies current or recent self injurious behavior or ideation.  Patient denies other safety concerns.  A safety and risk management plan has not been developed at this time, however patient was encouraged to call [place] [phone] should there be a change in any of these risk factors.      Attitude:   Cooperative   Orientation:   All  Speech   Rate / Production: Normal    Volume:  Normal   Mood:    Anxious  Dysphoric  Thought Content:  Clear   Thought Form:  Coherent  Logical   Insight:    Fair     Diagnoses:  1. Anxiety and depression        Collateral Reports Completed:  Not Applicable    Plan: (Homework, other):    Create relevant  treatment plan.     JHONY De La Torre

## 2022-01-10 ENCOUNTER — VIRTUAL VISIT (OUTPATIENT)
Dept: PSYCHOLOGY | Facility: OTHER | Age: 57
End: 2022-01-10
Attending: COUNSELOR
Payer: COMMERCIAL

## 2022-01-10 DIAGNOSIS — F41.9 ANXIETY AND DEPRESSION: Primary | ICD-10-CM

## 2022-01-10 DIAGNOSIS — F32.A ANXIETY AND DEPRESSION: Primary | ICD-10-CM

## 2022-01-10 PROCEDURE — 90832 PSYTX W PT 30 MINUTES: CPT | Mod: 95 | Performed by: COUNSELOR

## 2022-01-10 NOTE — PROGRESS NOTES
"    [facility]    Behavioral Health Progress Note    January 10, 2022      Patient Name: Yamil Vanegas         Service Type: Phone Visit           Session Start Time: 1100  Session End Time: 1130      Session Length: 16 - 37                  Phone call duration: 17 minutes      Attendees: Client      Yamil Vanegas is a 56 year old male who is being evaluated via a billable telephone visit.      The patient has been notified of following:     \"This telephone visit will be conducted via a call between you and your physician/provider. We have found that certain health care needs can be provided without the need for a physical exam.  This service lets us provide the care you need with a short phone conversation.  If a prescription is necessary we can send it directly to your pharmacy.  If lab work is needed we can place an order for that and you can then stop by our lab to have the test done at a later time.    If during the course of the call the physician/provider feels a telephone visit is not appropriate, you will not be charged for this service.\"     Patient has given verbal consent for Telephone visit?  Yes    Yamil Vanegas complains of    Chief Complaint   Patient presents with     Depression     Yamil was contacted as scheduled. He presented as O x 4,coherent,relevant and  Cooperative. The pt denied  S/H ideations. His memory was intact. His insight and judgement were within a range acceptable for safety.Yamil reported no significant distress, He said that he and his wife were talking more . 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 Reports that he hasn't used cannabis  and alcohol since several days after his dad's death.The pts taking his Wellbutrin as prescribed.        Treatment Objective(s) Addressed in This Session:  Target Behavior(s):  Depressed Mood: Decrease frequency and intensity of feeling down, depressed, " hopeless        Current Stressors / Issues:    Familial distress      Progress on Treatment Objective:    Pt reports that he and his wife are communicating better                  Previous PHQ-9:   PHQ 12/10/2021 12/14/2021 12/20/2021   PHQ-9 Total Score 10 8 0   Q9: Thoughts of better off dead/self-harm past 2 weeks Several days Not at all Not at all     Previous AILEEN-7:   AILEEN-7 SCORE 12/10/2021 12/14/2021 12/20/2021   Total Score 7 7 0             Medication Review:  No changes to current psychiatric medication(s)    Medication Compliance:  Yes    Changes in Health Issues:   None reported    Chemical Use Review:   Substance Use: Chemical use reviewed, no active concerns identified      Tobacco Use: smokes cigarettes daily        Assessment: Current Emotional / Mental Status (status of significant symptoms):    Risk status (Self / Other harm or suicidal ideation)  Patient denies current fears or concerns for personal safety.  Patient denies current or recent suicidal ideation or behaviors.  Patient denies current or recent homicidal ideation or behaviors.  Patient denies current or recent self injurious behavior or ideation.  Patient denies other safety concerns.  A safety and risk management plan has not been developed at this time, however patient was encouraged to call [place] [phone] should there be a change in any of these risk factors.      Attitude:   Cooperative   Orientation:   All  Speech   Rate / Production: Normal    Volume:  Normal   Mood:    Anxious  Dysphoric  Thought Content:  Clear   Thought Form:  Coherent  Logical   Insight:    Fair     Diagnoses:  1. Anxiety and depression        Collateral Reports Completed:  Not Applicable    Plan: (Homework, other):     Pt will continue daily engagement with wife    Lyle Jeffrey [facility]     The author of this note documented a reason for not sharing it with the patient.        Behavioral Health Treatment Plan      Client's Name: Yamil Ferminr  Date Of  Birth: 1965    Date: 1-7-2022    DSM-V Diagnoses: 296.32 (F33.1) Major Depressive Disorder, Recurrent Episode, Moderate _ and With anxious distress;     Psychosocial / Contextual Factors: Recent death of father;Strained relationship with wife and adult child;Lack               MeasurableTreatment Goal(s) related to diagnosis / functional impairment(s)    Goal 1:  Patient will experience a reduction in depressive  along with a corresponding increase in positive emotion and life satisfaction.    Objective #A:  Yamil will take medication daily as prescribed     Intervention: Motivational interviewing    Status: Continued - Date 7-7-22    Objective #B  Yamil will develop healthy cognitive patterns and beliefs      Intervention:  CBT/Motivational Interviewing      Status: Continued - Date 7-7-22    Objective #C:   Pt will identify triggers which may create a disturbance in mood.    Intervention:    CBT/Psycho-education/Motivational Interviewing    Status: Continued - Date(s): 7-7-22    Goal 2: Patient will experience a reduction in anxiety                              Objective # A: Patient will address relationship difficulties in a more adaptive manner                Intervention:  CBT/Motivational Interviewing                 Status: Continued - Date 7-7-2                 Objective # B  Patient will develop more effective coping skills to manage anxiety symptoms                            Intervention:  Skills Training                Status: Continued- Date 7-7-22      Possible Therapeutic Intervention(s)  Psycho-education regarding mental health diagnoses and treatment options    Skills training    Explore skills useful to client in current situation.    Skills include assertiveness, communication, conflict management, problem-solving, relaxation, etc.    Solution-Focused Therapy    Explore patterns in patient's relationships and discuss options for new behaviors.    Explore patterns in patient's actions and  choices and discuss options for new behaviors.     Behavioral Activation    Discuss steps patient can take to become more involved in meaningful activity.    Identify barriers to these activities and explore possible solutions.      We have developed these goals together during our work to this point. Patient has assisted in the development of these goals and has agreed to this treatment plan.        Yamil Vanegas   January 10, 2022

## 2022-01-18 NOTE — TELEPHONE ENCOUNTER
Pt called requesting medication refills, meds requested already have refills. Pt will call [facility] pharmacy to transfer scripts to pharmacy in the [facility]. Pt reports he moved and will establish care with a new PCP in the [facility].

## 2022-01-21 ENCOUNTER — TRANSFERRED RECORDS (OUTPATIENT)
Dept: HEALTH INFORMATION MANAGEMENT | Facility: CLINIC | Age: 57
End: 2022-01-21

## 2022-01-24 ENCOUNTER — VIRTUAL VISIT (OUTPATIENT)
Dept: PSYCHOLOGY | Facility: OTHER | Age: 57
End: 2022-01-24
Attending: COUNSELOR
Payer: COMMERCIAL

## 2022-01-24 DIAGNOSIS — F41.9 ANXIETY AND DEPRESSION: Primary | ICD-10-CM

## 2022-01-24 DIAGNOSIS — F32.A ANXIETY AND DEPRESSION: Primary | ICD-10-CM

## 2022-01-24 PROCEDURE — 90832 PSYTX W PT 30 MINUTES: CPT | Mod: 95 | Performed by: COUNSELOR

## 2022-01-24 NOTE — PROGRESS NOTES
"[facility]    Behavioral Health Progress Note    January 24, 2022      Patient Name: Yamil Vanegas         Service Type: Phone Visit           Session Start Time:  1100 Session End Time: 1130      Session Length: 16 - 37                  Phone call duration: 25 minutes      Attendees: Client      Yamil Vanegas is a 56 year old male who is being evaluated via a billable telephone visit.      The patient has been notified of following:     \"This telephone visit will be conducted via a call between you and your physician/provider. We have found that certain health care needs can be provided without the need for a physical exam.  This service lets us provide the care you need with a short phone conversation.  If a prescription is necessary we can send it directly to your pharmacy.  If lab work is needed we can place an order for that and you can then stop by our lab to have the test done at a later time.    If during the course of the call the physician/provider feels a telephone visit is not appropriate, you will not be charged for this service.\"     Patient has given verbal consent for Telephone visit?  Yes    Yamil Vanegas complains of    Chief Complaint   Patient presents with     Depression                Yamil was contacted as schedule. He presented as  O x 4.coherent,relevant and pleasant. He denied S/H ideations. His memory was intact. His insight and judgement were within a range an acceptable for safety. He reported that he wanted to come into the office next week because he is coming to [place] to see his med provider. Taking his medication as prescribed.      Yamil appears to be holding patter. Hasn't jacoby;ied for job. He attribute this failure to an inability to use a computer. He was encouraged to  an employment office to apply for a job.    Thew pt remains complacent,will continue to motivational interviewing to address his issues/      Treatment Objective(s) Addressed in This " Session:  Target Behavior(s):  Depressed Mood: Decrease frequency and intensity of feeling down, depressed, hopeless  Anxiety: will experience a reduction in anxiety        Current Stressors / Issues:    Lack of stable income.Difficulty using computers to apply for jobs      Progress on Treatment Objective:     taking meds as prescribed.Will see his psych provider on next Monday.    Previous PHQ-9:   PHQ 12/10/2021 12/14/2021 12/20/2021   PHQ-9 Total Score 10 8 0   Q9: Thoughts of better off dead/self-harm past 2 weeks Several days Not at all Not at all     Previous AILEEN-7:   AILEEN-7 SCORE 12/10/2021 12/14/2021 12/20/2021   Total Score 7 7 0             Medication Review:  No changes to current psychiatric medication(s)    Medication Compliance:  Yes    Changes in Health Issues:   Yes: Pain, No Psychological Distress    Chemical Use Review:   Substance Use: Chemical use reviewed, no active concerns identified      Tobacco Use: Smokes daily        Assessment: Current Emotional / Mental Status (status of significant symptoms):    Risk status (Self / Other harm or suicidal ideation)  Patient denies current fears or concerns for personal safety.  Patient denies current or recent suicidal ideation or behaviors.  Patient denies current or recent homicidal ideation or behaviors.  Patient denies current or recent self injurious behavior or ideation.  Patient denies other safety concerns.  A safety and risk management plan has not been developed at this time, however patient was encouraged to call [facility] / [facility] should there be a change in any of these risk factors.      Attitude:   Cooperative   Orientation:   All  Speech   Rate / Production: Normal/ Responsive Normal    Volume:  Normal   Mood:    Anxious  Depressed  Dysphoric  Thought Content:  Clear   Thought Form:  Coherent  Logical   Insight:    Good     Diagnoses:  1. Anxiety and depression        Collateral Reports Completed:  Not Applicable    Plan: (Homework,  other):    See the pt in the office. Encourage him at taking actions to improve his well being.    Lyle Jeffrey, [facility]     The author of this note documented a reason for not sharing it with the patient.

## 2022-01-31 ENCOUNTER — OFFICE VISIT (OUTPATIENT)
Dept: PSYCHOLOGY | Facility: OTHER | Age: 57
End: 2022-01-31
Attending: COUNSELOR
Payer: COMMERCIAL

## 2022-01-31 DIAGNOSIS — F32.A ANXIETY AND DEPRESSION: Primary | ICD-10-CM

## 2022-01-31 DIAGNOSIS — F41.9 ANXIETY AND DEPRESSION: Primary | ICD-10-CM

## 2022-01-31 PROCEDURE — 90837 PSYTX W PT 60 MINUTES: CPT | Performed by: COUNSELOR

## 2022-01-31 ASSESSMENT — ANXIETY QUESTIONNAIRES
3. WORRYING TOO MUCH ABOUT DIFFERENT THINGS: SEVERAL DAYS
7. FEELING AFRAID AS IF SOMETHING AWFUL MIGHT HAPPEN: SEVERAL DAYS
GAD7 TOTAL SCORE: 7
6. BECOMING EASILY ANNOYED OR IRRITABLE: SEVERAL DAYS
5. BEING SO RESTLESS THAT IT IS HARD TO SIT STILL: SEVERAL DAYS
1. FEELING NERVOUS, ANXIOUS, OR ON EDGE: SEVERAL DAYS
2. NOT BEING ABLE TO STOP OR CONTROL WORRYING: SEVERAL DAYS
IF YOU CHECKED OFF ANY PROBLEMS ON THIS QUESTIONNAIRE, HOW DIFFICULT HAVE THESE PROBLEMS MADE IT FOR YOU TO DO YOUR WORK, TAKE CARE OF THINGS AT HOME, OR GET ALONG WITH OTHER PEOPLE: SOMEWHAT DIFFICULT

## 2022-01-31 ASSESSMENT — PATIENT HEALTH QUESTIONNAIRE - PHQ9
SUM OF ALL RESPONSES TO PHQ QUESTIONS 1-9: 9
5. POOR APPETITE OR OVEREATING: SEVERAL DAYS

## 2022-01-31 NOTE — PROGRESS NOTES
Progress Note    Client Name: Yamil Vanegas  Date: January 31, 2022         Service Type: Individual       Session Start Time: 1100  Session End Time: 1200      Session Length: 55     Attendees: Client       PHQ-9 :   PHQ-9 SCORE 12/10/2021 12/14/2021 12/20/2021   PHQ-9 Total Score 10 8 0      AILEEN-7 :    AILEEN-7 SCORE 12/10/2021 12/14/2021 12/20/2021   Total Score 7 7 0           DATA     Yamil was seen today. He came from the [facility] to see his Med provider and me. We agreed that we could maintain regular contact by telehealth. The pt presented as O x 4,coherent.pleasant and cooperative. His affect was definitely brighter. Yamil denied S/H ideations.His memory was intact. His insight and judgement were within a range acceptable for safety. He distressed because he had taken his wife car and had left his car at home. He said that his wife is unable to drive his car and is very upset.    The pt reported an improved mood< Taking his medication as prescribed. The pt recently had an examination by  an oncologist. It was determined that he might have leukemia,He is hopeful that the Dr might be able to manage the disorder.               Progress Since Last Session (Related to Symptoms / Goals / Homework):   Symptoms: Improving Pt reports significant improvement mood    Homework: Completed in session     Current / Ongoing Stressors and Concerns:                  Abnormal finding in blood. Pt suspect it might be leukemia     Treatment Objective(s) Addressed in This Session:                          Alleviation of anxiety     Intervention:                  Motivational interviewing     Response to Interventions:                  Interactive listening. Stated interest in marital counseling with wife..     ASSESSMENT: Current Emotional / Mental Status (status of significant symptoms):   Risk status (Self / Other harm or suicidal ideation)   Client denies current fears or concerns  for personal safety.   Client denies current or recent suicidal ideation or behaviors.   Client denies current or recent homicidal ideation or behaviors.   Client denies current or recent self injurious behavior or ideation.   Client denies other safety concerns.   Recommended that patient call 911 or go to the local ED should there be a change in any of these risk factors.     Appearance:   Appropriate    Eye Contact:   Good    Psychomotor Behavior: Normal    Attitude:   Cooperative    Orientation:   All   Speech    Rate / Production: Normal     Volume:  Normal    Mood:    Anxious    Affect:    Mood congruent   Thought Content:  Clear    Thought Form:  Coherent  Logical    Insight:    Fair         Medication Compliance:   Yes     Changes in Health Issues:   Yes: fearful that he might have leukemia     Chemical Use Review:   Substance Use: Chemical use reviewed, no active concerns identified      Tobacco Use: Pt is daily smoker     Collateral Reports Completed:   Not Applicable    PLAN: (Client Tasks / Therapist Tasks / Other)    Continue regular contact by telephone.    Lyle Jeffrey [facility]  The author of this note documented a reason for not sharing it with the patient.

## 2022-02-01 ASSESSMENT — ANXIETY QUESTIONNAIRES: GAD7 TOTAL SCORE: 7

## 2022-02-07 ENCOUNTER — VIRTUAL VISIT (OUTPATIENT)
Dept: PSYCHOLOGY | Facility: OTHER | Age: 57
End: 2022-02-07
Attending: COUNSELOR
Payer: COMMERCIAL

## 2022-02-07 DIAGNOSIS — F32.A ANXIETY AND DEPRESSION: Primary | ICD-10-CM

## 2022-02-07 DIAGNOSIS — F41.9 ANXIETY AND DEPRESSION: Primary | ICD-10-CM

## 2022-02-07 PROCEDURE — 90832 PSYTX W PT 30 MINUTES: CPT | Mod: 95 | Performed by: COUNSELOR

## 2022-02-08 NOTE — PROGRESS NOTES
"[facility]    Behavioral Health Progress Note    2022      Patient Name: Yamil Vanegas         Service Type: Phone Visit           Session Start Time:  1100 Session End Time: 1130      Session Length: 16 - 37                  Phone call duration:  20 minutes      Attendees: Patient      Yamil Vanegas is a 56 year old male who is being evaluated via a billable telephone visit.      The patient has been notified of following:     \"This telephone visit will be conducted via a call between you and your physician/provider. We have found that certain health care needs can be provided without the need for a physical exam.  This service lets us provide the care you need with a short phone conversation.  If a prescription is necessary we can send it directly to your pharmacy.  If lab work is needed we can place an order for that and you can then stop by our lab to have the test done at a later time.    If during the course of the call the physician/provider feels a telephone visit is not appropriate, you will not be charged for this service.\"     Patient has given verbal consent for Telephone visit?  Yes    Yamil Vanegas complains of    Chief Complaint   Patient presents with     Depression      The pt was contact for his regular scheduled appointment. He presented as O x 4,coherent,relevant and decent spirit. He denied S/H ideations.His memory was intact.His insight and judgement were within a range acceptable for safety.     Yamil reported that he had returned to the [facility] without a significant argument with his wife.Reporting that he is communicating better with his wife.Eating together with his family. They had been living in separate part of the house without much contact. The pt is dealing with the possibility of having cancer. He says that he will handle it if it is case. His father recently  of cancer. He said that he will do chemotherapy if necessary.            Treatment " Objective(s) Addressed in This Session:  Target Behavior(s):  Anxiety: will develop more effective coping skills to manage anxiety symptoms        Current Stressors / Issues:     The pt is concerned that he may have leukemia      Progress on Treatment Objective:    Pt states an appropriate manner for handle some of his distress    Previous PHQ-9:   PHQ 12/14/2021 12/20/2021 1/31/2022   PHQ-9 Total Score 8 0 9   Q9: Thoughts of better off dead/self-harm past 2 weeks Not at all Not at all Several days     Previous AILEEN-7:   AILEEN-7 SCORE 12/14/2021 12/20/2021 1/31/2022   Total Score 7 0 7             Medication Review:  No changes to current psychiatric medication(s)    Medication Compliance:  Yes    Changes in Health Issues:      May have cancer. Some concerns    Chemical Use Review:   Substance Use: Chemical use reviewed, no active concerns identified      Tobacco Use: The pt is a daily smoker        Assessment: Current Emotional / Mental Status (status of significant symptoms):    Risk status (Self / Other harm or suicidal ideation)  Patient denies current fears or concerns for personal safety.  Patient denies current or recent suicidal ideation or behaviors.  Patient denies current or recent homicidal ideation or behaviors.  Patient denies current or recent self injurious behavior or ideation.  Patient denies other safety concerns.  A safety and risk management plan has not been developed at this time, however patient was encouraged to call [place] [phone] should there be a change in any of these risk factors.      Attitude:   Cooperative   Orientation:   All  Speech   Rate / Production: Normal    Volume:  Normal   Mood:    Anxious  Dysphoric   Thought Content:  Clear   Thought Form:  Coherent  Logical   Insight:    Fair     Diagnoses:  1. Anxiety and depression        Collateral Reports Completed:  Not Applicable    Plan: (Homework, other):    Provide support and explore possible distress around his medical  condition.    Lyle Jeffrey, [facility]     The author of this note documented a reason for not sharing it with the patient.

## 2022-02-28 ENCOUNTER — VIRTUAL VISIT (OUTPATIENT)
Dept: PSYCHOLOGY | Facility: OTHER | Age: 57
End: 2022-02-28
Attending: COUNSELOR
Payer: COMMERCIAL

## 2022-02-28 DIAGNOSIS — F32.A ANXIETY AND DEPRESSION: Primary | ICD-10-CM

## 2022-02-28 DIAGNOSIS — F41.9 ANXIETY AND DEPRESSION: Primary | ICD-10-CM

## 2022-02-28 PROCEDURE — 90832 PSYTX W PT 30 MINUTES: CPT | Mod: 95 | Performed by: COUNSELOR

## 2022-02-28 NOTE — PROGRESS NOTES
"[facility]    Behavioral Health Progress Note    February 28, 2022      Patient Name: Yamil Vanegas         Service Type: Phone Visit           Session Start Time:  1100  Session End Time: 1130      Session Length: 16 - 37               Phone call duration: 20 minutes      Attendees: Patient      Yamil Vanegas is a 56 year old male who is being evaluated via a billable telephone visit.      The patient has been notified of following:     \"This telephone visit will be conducted via a call between you and your physician/provider. We have found that certain health care needs can be provided without the need for a physical exam.  This service lets us provide the care you need with a short phone conversation.  If a prescription is necessary we can send it directly to your pharmacy.  If lab work is needed we can place an order for that and you can then stop by our lab to have the test done at a later time.    If during the course of the call the physician/provider feels a telephone visit is not appropriate, you will not be charged for this service.\"     Patient has given verbal consent for Telephone visit?  Yes    Yamil Vanegas complains of    Chief Complaint   Patient presents with     Depression             Yamil was contacted for his scheduled telephone interview. He was O x 4,coherent,relevant and in decent spirits, even though has been diagnosed with leukemia.The pt denied S/H ideations.  His memory appeared to be intact. Yamil's insight and judgement were within a range acceptable for safety.    The pt reported that he has a firm diagnosis of leukemia ad that he has started a round of chemotherapy. He said that his first round was not that remarkable. He is somewhat fearful of the second treatment because he has been warned that it is more difficult.Yamil said that he will deal with it.    The pt reports that he and his wife are talking more. He is hopeful of an improved relationship. Discuss the " possibility of applying for disability. Yamil said that he would be applying.      Treatment Objective(s) Addressed in This Session:  Target Behavior(s):  Anxiety: will develop more effective coping skills to manage anxiety symptoms        Current Stressors / Issues:     Recent diagnosed with leukemia        Progress on Treatment Objective:      Appear to be accepting of diagnosis without significant distress.        Previous PHQ-9:   PHQ 12/14/2021 12/20/2021 1/31/2022   PHQ-9 Total Score 8 0 9   Q9: Thoughts of better off dead/self-harm past 2 weeks Not at all Not at all Several days     Previous AILEEN-7:   AILEEN-7 SCORE 12/14/2021 12/20/2021 1/31/2022   Total Score 7 0 7             Medication Review:  No changes to current psychiatric medication(s)    Medication Compliance:  Yes    Changes in Health Issues:     Pt has been diagnosed with Leukemia      Chemical Use Review:   Substance Use: Chemical use reviewed, no active concerns identified      Tobacco Use: The pt smokes.        Assessment: Current Emotional / Mental Status (status of significant symptoms):    Risk status (Self / Other harm or suicidal ideation)  Patient denies current fears or concerns for personal safety.  Patient denies current or recent suicidal ideation or behaviors.  Patient denies current or recent homicidal ideation or behaviors.  Patient denies current or recent self injurious behavior or ideation.  Patient denies other safety concerns.  A safety and risk management plan has not been developed at this time, however patient was encouraged to call [place] [phone] should there be a change in any of these risk factors.      Attitude:   Cooperative   Orientation:   All  Speech   Rate / Production: Normal    Volume:  Normal   Mood:    Anxious   Thought Content:  Clear   Thought Form:  Coherent  Logical   Insight:    Good     Diagnoses:  1. Anxiety and depression        Collateral Reports Completed:  Not Applicable    Plan: (Homework,  other):    F/U in two weeks.      Lyle Jeffrey, [facility]     The author of this note documented a reason for not sharing it with the patient.

## 2022-03-14 ENCOUNTER — TELEPHONE (OUTPATIENT)
Dept: PSYCHOLOGY | Facility: OTHER | Age: 57
End: 2022-03-14
Payer: COMMERCIAL

## 2022-03-17 ENCOUNTER — VIRTUAL VISIT (OUTPATIENT)
Dept: PSYCHOLOGY | Facility: OTHER | Age: 57
End: 2022-03-17
Attending: COUNSELOR
Payer: COMMERCIAL

## 2022-03-17 DIAGNOSIS — F32.A ANXIETY AND DEPRESSION: Primary | ICD-10-CM

## 2022-03-17 DIAGNOSIS — F41.9 ANXIETY AND DEPRESSION: Primary | ICD-10-CM

## 2022-03-17 PROCEDURE — 90832 PSYTX W PT 30 MINUTES: CPT | Mod: 95 | Performed by: COUNSELOR

## 2022-03-28 ENCOUNTER — VIRTUAL VISIT (OUTPATIENT)
Dept: PSYCHOLOGY | Facility: OTHER | Age: 57
End: 2022-03-28
Attending: COUNSELOR
Payer: COMMERCIAL

## 2022-03-28 DIAGNOSIS — F41.9 ANXIETY AND DEPRESSION: Primary | ICD-10-CM

## 2022-03-28 DIAGNOSIS — F32.A ANXIETY AND DEPRESSION: Primary | ICD-10-CM

## 2022-03-28 PROCEDURE — 90832 PSYTX W PT 30 MINUTES: CPT | Mod: 95 | Performed by: COUNSELOR

## 2022-03-29 NOTE — PROGRESS NOTES
"[facility]    Behavioral Health Progress Note    March 28, 2022      Patient Name: Yamil Vanegas         Service Type: Phone Visit           Session Start Time:  1100  Session End Time: 1130      Session Length: 16 - 37                  Phone call duration: 20 minutes      Attendees: Client      Yamil Vanegas is a 56 year old male who is being evaluated via a billable telephone visit.      The patient has been notified of following:     \"This telephone visit will be conducted via a call between you and your physician/provider. We have found that certain health care needs can be provided without the need for a physical exam.  This service lets us provide the care you need with a short phone conversation.  If a prescription is necessary we can send it directly to your pharmacy.  If lab work is needed we can place an order for that and you can then stop by our lab to have the test done at a later time.    If during the course of the call the physician/provider feels a telephone visit is not appropriate, you will not be charged for this service.\"     Patient has given verbal consent for Telephone visit?  Yes    Yamil Vanegas complains of    Chief Complaint   Patient presents with     Depression       Yamil was contacted as scheduled by telephone at his home in [place]. He was O x 4,coherent,relevant and pleasant.He denied S/H ideations. His memory appeared to be intact.His insight and judgement were within a range acceptable for safety. He is scheduled for second treatment of chemotherapy. He said that his first treatment went well. He is not anticipating any problems with  His second round. Has not applied for disability as previously discussed. Denying any communication problems with his wife contrary to what she reports        Treatment Objective(s) Addressed in This Session:  Target Behavior(s):  Anxiety: will experience a reduction in anxiety        Current Stressors / Issues:    The pt is going " through treatment for chemotheraphy          Progress on Treatment Objective:    Yamil appears to be stable at present. He is still having communication problems. He has a habit of denying any problems.    Previous PHQ-9:   PHQ 12/14/2021 12/20/2021 1/31/2022   PHQ-9 Total Score 8 0 9   Q9: Thoughts of better off dead/self-harm past 2 weeks Not at all Not at all Several days     Previous AILEEN-7:   AILEEN-7 SCORE 12/14/2021 12/20/2021 1/31/2022   Total Score 7 0 7         Medication Review:  No changes to current psychiatric medication(s)    Medication Compliance:  Yes    Changes in Health Issues:     The pt is being treated for leukemia.    Chemical Use Review:   Substance Use: Chemical use reviewed, no active concerns identified      Tobacco Use: The pt smokes daily        Assessment: Current Emotional / Mental Status (status of significant symptoms):    Risk status (Self / Other harm or suicidal ideation)  Patient denies current fears or concerns for personal safety.  Patient denies current or recent suicidal ideation or behaviors.  Patient denies current or recent homicidal ideation or behaviors.  Patient denies current or recent self injurious behavior or ideation.  Patient denies other safety concerns.  A safety and risk management plan has not been developed at this time, however patient was encouraged to call [place] [phone] should there be a change in any of these risk factors.      Attitude:   Cooperative   Orientation:   All  Speech   Rate / Production: Normal    Volume:  Normal   Mood:    Anxious  Ambivalence  Thought Content:  Clear   Thought Form:  Coherent  Logical   Insight:    Good     Diagnoses:  1. Anxiety and depression        Collateral Reports Completed:  Not Applicable    Plan: (Homework, other):      Encourage  the pt  At expressing his concerns.    Lyle Jeffrey [facility]    The author of this note documented a reason for not sharing it with the patient.

## 2022-04-22 ENCOUNTER — TELEPHONE (OUTPATIENT)
Dept: PSYCHOLOGY | Facility: OTHER | Age: 57
End: 2022-04-22
Payer: COMMERCIAL

## 2022-04-22 NOTE — TELEPHONE ENCOUNTER
lvm for pt to call back to reschedule appt to 4/22 at 2 pm; pt was contacted for reminder call and asked if he could have appt moved to today 4/22 as he cannot make appt on monday

## 2022-04-25 ENCOUNTER — VIRTUAL VISIT (OUTPATIENT)
Dept: PSYCHOLOGY | Facility: OTHER | Age: 57
End: 2022-04-25
Attending: COUNSELOR
Payer: COMMERCIAL

## 2022-04-25 ENCOUNTER — TELEPHONE (OUTPATIENT)
Dept: PSYCHOLOGY | Facility: OTHER | Age: 57
End: 2022-04-25
Payer: COMMERCIAL

## 2022-04-25 DIAGNOSIS — F32.A ANXIETY AND DEPRESSION: Primary | ICD-10-CM

## 2022-04-25 DIAGNOSIS — F41.9 ANXIETY AND DEPRESSION: Primary | ICD-10-CM

## 2022-05-09 ENCOUNTER — VIRTUAL VISIT (OUTPATIENT)
Dept: PSYCHOLOGY | Facility: OTHER | Age: 57
End: 2022-05-09
Attending: COUNSELOR
Payer: COMMERCIAL

## 2022-05-09 ENCOUNTER — TELEPHONE (OUTPATIENT)
Dept: PSYCHOLOGY | Facility: OTHER | Age: 57
End: 2022-05-09
Payer: COMMERCIAL

## 2022-05-09 DIAGNOSIS — F32.A ANXIETY AND DEPRESSION: Primary | ICD-10-CM

## 2022-05-09 DIAGNOSIS — F41.9 ANXIETY AND DEPRESSION: Primary | ICD-10-CM

## 2022-06-08 NOTE — PROGRESS NOTES
"[facility]    Behavioral Health Progress Note    March 17, 2022      Patient Name: Yamil Vanegas         Service Type: Phone Visit           Session Start Time:  0130  Session End Time: 0200    Session Length: 16 - 37 \              Phone call duration: 25 minutes      Attendees: Client      Yamil Vanegas is a 56 year old male who is being evaluated via a billable telephone visit.      The patient has been notified of following:     \"This telephone visit will be conducted via a call between you and your physician/provider. We have found that certain health care needs can be provided without the need for a physical exam.  This service lets us provide the care you need with a short phone conversation.  If a prescription is necessary we can send it directly to your pharmacy.  If lab work is needed we can place an order for that and you can then stop by our lab to have the test done at a later time.    If during the course of the call the physician/provider feels a telephone visit is not appropriate, you will not be charged for this service.\"     Patient has given verbal consent for Telephone visit?  Yes    Yamil Vanegas complains of    Chief Complaint   Patient presents with     Depression     Yamil was contacted today for his scheduled interview. He presented as O x 4,coherent,relevant and pleasant/ He denied S/H ideations. His memory was intact, His insight and judgment were within a range acceptable for safety    Yamil had a course of chemotherapy this week for leukemia.He said he suffered no side effects from it. He has three more treatment . He expresses no significant anxiety about it so far it. Yamil says that he will try manage whatever comes up related to treatment.    The pt continues to be limited his range of activity. 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 Discussed with him the make application for disability since he is unable to work now,    Discuss ways " of  Improving listening to his wife,including rephrasing what he thought he heard from her.    Treatment Objective(s) Addressed in This Session:  Target Behavior(s):  Anxiety: will develop more effective coping skills to manage anxiety symptoms      Current Stressors / Issues:    Chemotherapy for leukemia    Progress on Treatment Objective:    Pt appears to be controlling his anxiety/      Previous PHQ-9:   PHQ 12/14/2021 12/20/2021 1/31/2022   PHQ-9 Total Score 8 0 9   Q9: Thoughts of better off dead/self-harm past 2 weeks Not at all Not at all Several days     Previous AILEEN-7:   AILEEN-7 SCORE 12/14/2021 12/20/2021 1/31/2022   Total Score 7 0 7             Medication Review:  No changes to current psychiatric medication(s)    Medication Compliance:  Yes    Changes in Health Issues:      The pt has started chemotherapy for leukemia.      Chemical Use Review:   Substance Use: Chemical use reviewed, no active concerns identified      Tobacco Use: The pt is daily smoker        Assessment: Current Emotional / Mental Status (status of significant symptoms):    Risk status (Self / Other harm or suicidal ideation)  Patient denies current fears or concerns for personal safety.  Patient denies current or recent suicidal ideation or behaviors.  Patient denies current or recent homicidal ideation or behaviors.  Patient denies current or recent self injurious behavior or ideation.  Patient denies other safety concerns.  A safety and risk management plan has not been developed at this time, however patient was encouraged to call [facility] should there be a change in any of these risk factors.      Attitude:   Cooperative   Orientation:   All  Speech   Rate / Production: Normal    Volume:  Normal   Mood:    Anxious  Ambivalence  Thought Content:  Clear   Thought Form:  Coherent  Logical   Insight:    Fair     Diagnoses:  1. Anxiety and depression        Collateral Reports Completed:    N/A    Plan: (Homework,  other):    Continue to work with the pt's social skills      Lyle Jeffrey [facility]     The author of this note documented a reason for not sharing it with the patient.     Patient/Caregiver provided printed discharge information.